# Patient Record
Sex: FEMALE | Race: ASIAN | NOT HISPANIC OR LATINO | URBAN - METROPOLITAN AREA
[De-identification: names, ages, dates, MRNs, and addresses within clinical notes are randomized per-mention and may not be internally consistent; named-entity substitution may affect disease eponyms.]

---

## 2018-03-04 ENCOUNTER — INPATIENT (INPATIENT)
Facility: HOSPITAL | Age: 83
LOS: 3 days | Discharge: HOME HEALTH | End: 2018-03-08
Attending: INTERNAL MEDICINE | Admitting: INTERNAL MEDICINE
Payer: MEDICAID

## 2018-03-04 VITALS
HEART RATE: 65 BPM | RESPIRATION RATE: 22 BRPM | OXYGEN SATURATION: 98 % | DIASTOLIC BLOOD PRESSURE: 75 MMHG | TEMPERATURE: 98 F | SYSTOLIC BLOOD PRESSURE: 148 MMHG

## 2018-03-04 DIAGNOSIS — I10 ESSENTIAL (PRIMARY) HYPERTENSION: ICD-10-CM

## 2018-03-04 DIAGNOSIS — E78.5 HYPERLIPIDEMIA, UNSPECIFIED: ICD-10-CM

## 2018-03-04 DIAGNOSIS — E11.9 TYPE 2 DIABETES MELLITUS WITHOUT COMPLICATIONS: ICD-10-CM

## 2018-03-04 DIAGNOSIS — I50.23 ACUTE ON CHRONIC SYSTOLIC (CONGESTIVE) HEART FAILURE: ICD-10-CM

## 2018-03-04 DIAGNOSIS — Z95.2 PRESENCE OF PROSTHETIC HEART VALVE: Chronic | ICD-10-CM

## 2018-03-04 DIAGNOSIS — I50.9 HEART FAILURE, UNSPECIFIED: ICD-10-CM

## 2018-03-04 PROBLEM — Z00.00 ENCOUNTER FOR PREVENTIVE HEALTH EXAMINATION: Status: ACTIVE | Noted: 2018-03-04

## 2018-03-04 LAB
ALBUMIN SERPL ELPH-MCNC: 3.6 G/DL — SIGNIFICANT CHANGE UP (ref 3.3–5)
ALP SERPL-CCNC: 87 U/L — SIGNIFICANT CHANGE UP (ref 40–120)
ALT FLD-CCNC: 16 U/L — SIGNIFICANT CHANGE UP (ref 4–33)
APTT BLD: 30.8 SEC — SIGNIFICANT CHANGE UP (ref 27.5–37.4)
AST SERPL-CCNC: 31 U/L — SIGNIFICANT CHANGE UP (ref 4–32)
BASE EXCESS BLDV CALC-SCNC: -0.4 MMOL/L — SIGNIFICANT CHANGE UP
BASOPHILS # BLD AUTO: 0.01 K/UL — SIGNIFICANT CHANGE UP (ref 0–0.2)
BASOPHILS NFR BLD AUTO: 0.2 % — SIGNIFICANT CHANGE UP (ref 0–2)
BILIRUB SERPL-MCNC: 0.6 MG/DL — SIGNIFICANT CHANGE UP (ref 0.2–1.2)
BLOOD GAS VENOUS - CREATININE: 0.84 MG/DL — SIGNIFICANT CHANGE UP (ref 0.5–1.3)
BUN SERPL-MCNC: 25 MG/DL — HIGH (ref 7–23)
CALCIUM SERPL-MCNC: 8.5 MG/DL — SIGNIFICANT CHANGE UP (ref 8.4–10.5)
CHLORIDE BLDV-SCNC: 101 MMOL/L — SIGNIFICANT CHANGE UP (ref 96–108)
CHLORIDE SERPL-SCNC: 97 MMOL/L — LOW (ref 98–107)
CK MB BLD-MCNC: 2.89 NG/ML — SIGNIFICANT CHANGE UP (ref 1–4.7)
CK SERPL-CCNC: 51 U/L — SIGNIFICANT CHANGE UP (ref 25–170)
CO2 SERPL-SCNC: 22 MMOL/L — SIGNIFICANT CHANGE UP (ref 22–31)
CREAT SERPL-MCNC: 1.04 MG/DL — SIGNIFICANT CHANGE UP (ref 0.5–1.3)
EOSINOPHIL # BLD AUTO: 0.08 K/UL — SIGNIFICANT CHANGE UP (ref 0–0.5)
EOSINOPHIL NFR BLD AUTO: 1.6 % — SIGNIFICANT CHANGE UP (ref 0–6)
GAS PNL BLDV: 129 MMOL/L — LOW (ref 136–146)
GLUCOSE BLDV-MCNC: 184 — HIGH (ref 70–99)
GLUCOSE SERPL-MCNC: 195 MG/DL — HIGH (ref 70–99)
HCO3 BLDV-SCNC: 22 MMOL/L — SIGNIFICANT CHANGE UP (ref 20–27)
HCT VFR BLD CALC: 31.8 % — LOW (ref 34.5–45)
HCT VFR BLDV CALC: 32.4 % — LOW (ref 34.5–45)
HGB BLD-MCNC: 10.2 G/DL — LOW (ref 11.5–15.5)
HGB BLDV-MCNC: 10.5 G/DL — LOW (ref 11.5–15.5)
IMM GRANULOCYTES # BLD AUTO: 0.01 # — SIGNIFICANT CHANGE UP
IMM GRANULOCYTES NFR BLD AUTO: 0.2 % — SIGNIFICANT CHANGE UP (ref 0–1.5)
INR BLD: 1.26 — HIGH (ref 0.88–1.17)
LACTATE BLDV-MCNC: 1.1 MMOL/L — SIGNIFICANT CHANGE UP (ref 0.5–2)
LG PLATELETS BLD QL AUTO: SLIGHT — SIGNIFICANT CHANGE UP
LIDOCAIN IGE QN: 41.9 U/L — SIGNIFICANT CHANGE UP (ref 7–60)
LYMPHOCYTES # BLD AUTO: 1.04 K/UL — SIGNIFICANT CHANGE UP (ref 1–3.3)
LYMPHOCYTES # BLD AUTO: 20.9 % — SIGNIFICANT CHANGE UP (ref 13–44)
MANUAL SMEAR VERIFICATION: SIGNIFICANT CHANGE UP
MCHC RBC-ENTMCNC: 26.2 PG — LOW (ref 27–34)
MCHC RBC-ENTMCNC: 32.1 % — SIGNIFICANT CHANGE UP (ref 32–36)
MCV RBC AUTO: 81.7 FL — SIGNIFICANT CHANGE UP (ref 80–100)
MONOCYTES # BLD AUTO: 0.47 K/UL — SIGNIFICANT CHANGE UP (ref 0–0.9)
MONOCYTES NFR BLD AUTO: 9.4 % — SIGNIFICANT CHANGE UP (ref 2–14)
NEUTROPHILS # BLD AUTO: 3.37 K/UL — SIGNIFICANT CHANGE UP (ref 1.8–7.4)
NEUTROPHILS NFR BLD AUTO: 67.7 % — SIGNIFICANT CHANGE UP (ref 43–77)
NRBC # FLD: 0 — SIGNIFICANT CHANGE UP
NT-PROBNP SERPL-SCNC: SIGNIFICANT CHANGE UP PG/ML
NT-PROBNP SERPL-SCNC: SIGNIFICANT CHANGE UP PG/ML
PCO2 BLDV: 46 MMHG — SIGNIFICANT CHANGE UP (ref 41–51)
PH BLDV: 7.34 PH — SIGNIFICANT CHANGE UP (ref 7.32–7.43)
PLATELET # BLD AUTO: 120 K/UL — LOW (ref 150–400)
PLATELET CLUMP BLD QL SMEAR: SLIGHT — SIGNIFICANT CHANGE UP
PLATELET COUNT - ESTIMATE: SIGNIFICANT CHANGE UP
PMV BLD: 12 FL — SIGNIFICANT CHANGE UP (ref 7–13)
PO2 BLDV: < 24 MMHG — LOW (ref 35–40)
POTASSIUM BLDV-SCNC: 4.4 MMOL/L — SIGNIFICANT CHANGE UP (ref 3.4–4.5)
POTASSIUM SERPL-MCNC: 4.4 MMOL/L — SIGNIFICANT CHANGE UP (ref 3.5–5.3)
POTASSIUM SERPL-SCNC: 4.4 MMOL/L — SIGNIFICANT CHANGE UP (ref 3.5–5.3)
PROT SERPL-MCNC: 7.8 G/DL — SIGNIFICANT CHANGE UP (ref 6–8.3)
PROTHROM AB SERPL-ACNC: 14.1 SEC — HIGH (ref 9.8–13.1)
RBC # BLD: 3.89 M/UL — SIGNIFICANT CHANGE UP (ref 3.8–5.2)
RBC # FLD: 16.6 % — HIGH (ref 10.3–14.5)
SAO2 % BLDV: 15.4 % — LOW (ref 60–85)
SODIUM SERPL-SCNC: 132 MMOL/L — LOW (ref 135–145)
TROPONIN T SERPL-MCNC: < 0.06 NG/ML — SIGNIFICANT CHANGE UP (ref 0–0.06)
WBC # BLD: 4.98 K/UL — SIGNIFICANT CHANGE UP (ref 3.8–10.5)
WBC # FLD AUTO: 4.98 K/UL — SIGNIFICANT CHANGE UP (ref 3.8–10.5)

## 2018-03-04 PROCEDURE — 71045 X-RAY EXAM CHEST 1 VIEW: CPT | Mod: 26

## 2018-03-04 PROCEDURE — 74177 CT ABD & PELVIS W/CONTRAST: CPT | Mod: 26

## 2018-03-04 RX ORDER — INSULIN LISPRO 100/ML
VIAL (ML) SUBCUTANEOUS
Qty: 0 | Refills: 0 | Status: DISCONTINUED | OUTPATIENT
Start: 2018-03-04 | End: 2018-03-08

## 2018-03-04 RX ORDER — DEXTROSE 50 % IN WATER 50 %
25 SYRINGE (ML) INTRAVENOUS ONCE
Qty: 0 | Refills: 0 | Status: DISCONTINUED | OUTPATIENT
Start: 2018-03-04 | End: 2018-03-08

## 2018-03-04 RX ORDER — ATORVASTATIN CALCIUM 80 MG/1
10 TABLET, FILM COATED ORAL
Qty: 0 | Refills: 0 | COMMUNITY

## 2018-03-04 RX ORDER — SODIUM CHLORIDE 9 MG/ML
1000 INJECTION, SOLUTION INTRAVENOUS
Qty: 0 | Refills: 0 | Status: DISCONTINUED | OUTPATIENT
Start: 2018-03-04 | End: 2018-03-08

## 2018-03-04 RX ORDER — FAMOTIDINE 10 MG/ML
20 INJECTION INTRAVENOUS ONCE
Qty: 0 | Refills: 0 | Status: COMPLETED | OUTPATIENT
Start: 2018-03-04 | End: 2018-03-04

## 2018-03-04 RX ORDER — FUROSEMIDE 40 MG
40 TABLET ORAL ONCE
Qty: 0 | Refills: 0 | Status: COMPLETED | OUTPATIENT
Start: 2018-03-04 | End: 2018-03-04

## 2018-03-04 RX ORDER — GLUCAGON INJECTION, SOLUTION 0.5 MG/.1ML
1 INJECTION, SOLUTION SUBCUTANEOUS ONCE
Qty: 0 | Refills: 0 | Status: DISCONTINUED | OUTPATIENT
Start: 2018-03-04 | End: 2018-03-08

## 2018-03-04 RX ORDER — DEXTROSE 50 % IN WATER 50 %
12.5 SYRINGE (ML) INTRAVENOUS ONCE
Qty: 0 | Refills: 0 | Status: DISCONTINUED | OUTPATIENT
Start: 2018-03-04 | End: 2018-03-08

## 2018-03-04 RX ORDER — PANTOPRAZOLE SODIUM 20 MG/1
40 TABLET, DELAYED RELEASE ORAL
Qty: 0 | Refills: 0 | Status: DISCONTINUED | OUTPATIENT
Start: 2018-03-04 | End: 2018-03-05

## 2018-03-04 RX ORDER — GLIMEPIRIDE 1 MG
1 TABLET ORAL
Qty: 0 | Refills: 0 | COMMUNITY

## 2018-03-04 RX ORDER — ATORVASTATIN CALCIUM 80 MG/1
10 TABLET, FILM COATED ORAL AT BEDTIME
Qty: 0 | Refills: 0 | Status: DISCONTINUED | OUTPATIENT
Start: 2018-03-04 | End: 2018-03-08

## 2018-03-04 RX ORDER — DEXTROSE 50 % IN WATER 50 %
1 SYRINGE (ML) INTRAVENOUS ONCE
Qty: 0 | Refills: 0 | Status: DISCONTINUED | OUTPATIENT
Start: 2018-03-04 | End: 2018-03-08

## 2018-03-04 RX ORDER — SITAGLIPTIN 50 MG/1
1 TABLET, FILM COATED ORAL
Qty: 0 | Refills: 0 | COMMUNITY

## 2018-03-04 RX ORDER — ENOXAPARIN SODIUM 100 MG/ML
40 INJECTION SUBCUTANEOUS DAILY
Qty: 0 | Refills: 0 | Status: DISCONTINUED | OUTPATIENT
Start: 2018-03-04 | End: 2018-03-08

## 2018-03-04 RX ORDER — LISINOPRIL 2.5 MG/1
5 TABLET ORAL DAILY
Qty: 0 | Refills: 0 | Status: DISCONTINUED | OUTPATIENT
Start: 2018-03-04 | End: 2018-03-08

## 2018-03-04 RX ORDER — METOPROLOL TARTRATE 50 MG
50 TABLET ORAL
Qty: 0 | Refills: 0 | Status: DISCONTINUED | OUTPATIENT
Start: 2018-03-04 | End: 2018-03-08

## 2018-03-04 RX ORDER — FUROSEMIDE 40 MG
40 TABLET ORAL
Qty: 0 | Refills: 0 | Status: DISCONTINUED | OUTPATIENT
Start: 2018-03-04 | End: 2018-03-07

## 2018-03-04 RX ORDER — METFORMIN HYDROCHLORIDE 850 MG/1
0 TABLET ORAL
Qty: 0 | Refills: 0 | COMMUNITY

## 2018-03-04 RX ORDER — OMEPRAZOLE 10 MG/1
1 CAPSULE, DELAYED RELEASE ORAL
Qty: 0 | Refills: 0 | COMMUNITY

## 2018-03-04 RX ADMIN — FAMOTIDINE 20 MILLIGRAM(S): 10 INJECTION INTRAVENOUS at 09:42

## 2018-03-04 RX ADMIN — Medication 40 MILLIGRAM(S): at 18:22

## 2018-03-04 RX ADMIN — Medication 30 MILLILITER(S): at 09:42

## 2018-03-04 RX ADMIN — LISINOPRIL 5 MILLIGRAM(S): 2.5 TABLET ORAL at 18:23

## 2018-03-04 RX ADMIN — Medication 2: at 18:22

## 2018-03-04 RX ADMIN — ATORVASTATIN CALCIUM 10 MILLIGRAM(S): 80 TABLET, FILM COATED ORAL at 23:50

## 2018-03-04 RX ADMIN — Medication 50 MILLIGRAM(S): at 18:22

## 2018-03-04 RX ADMIN — Medication 40 MILLIGRAM(S): at 13:36

## 2018-03-04 NOTE — H&P ADULT - PMH
DM (diabetes mellitus)    Heart failure, systolic, due to CAD    HLD (hyperlipidemia)    HTN (hypertension)

## 2018-03-04 NOTE — ED PROVIDER NOTE - OBJECTIVE STATEMENT
82yo F pmhx htn hld dm p/w CC abdominal pain/SOB x past month worsening over past 2 days, reports burning epigastric pain, describes it as acidity, radiates into her chest, associated w/ SOB when lying flat, and abdominal distension. Last BM was today and was small, not passing flatus. Denies fever chills n/v/d urinary symptoms. 84yo F pmhx htn hld dm p/w CC abdominal pain/SOB x past month worsening over past 2 days, reports burning epigastric pain, describes it as acidity, radiates into her chest, associated w/ SOB when lying flat, and abdominal distension. Last BM was today and was small, not passing flatus. Denies fever chills n/v/d urinary symptoms..

## 2018-03-04 NOTE — ED PROVIDER NOTE - PROGRESS NOTE DETAILS
Spoke w/ radiology, concern for R ext. iliac art. focal dissection. Vascular surgery consulted. Vascular recommending continuing aspirin at this time, no need for surgical intervention

## 2018-03-04 NOTE — ED PROVIDER NOTE - MEDICAL DECISION MAKING DETAILS
84yo F pmhx htn hld dm p/w CC abdominal pain - poor historian - concerning for possible SBO/intraabdominal process, cannot r/o atypical ACS presentation although suspicion is - will send labs lipase troponins ct abd/pelv symptomatic rx and reassess.

## 2018-03-04 NOTE — H&P ADULT - ATTENDING COMMENTS
ekg - nsr lbbb    a/p     1) Epigastric pain - mild tenderness, CT a/p doesn't explain pain, will get GI consult     2) Acute on chronic systolic CHF exacerbation - cont IV lasix, as per daughter in chart EF 25%, tried to get in touch with daughter , but phone number doesn't work. will see if pt has had ischemia workup    3) HLD - on insulin

## 2018-03-04 NOTE — H&P ADULT - HISTORY OF PRESENT ILLNESS
This is  a 82 yo Fa c/o epigastric pain x 2 days . Patient  has been having epigastric pain with SOB and Orthopnea. Pain is described as tightness around her epigastric area to the back.  No agravating or alleviating factors. Pain occurs when she ambulates and even when sitting down. As per her daughter she has been unable to lay flat and sits up often due to orthopnea. She also has had lower extremity edema x 3 days. Patient has been compliant with her lasix 20mg BID. As per patient's daughter her EF is 24%.   No palpitations, no fever , chills, No abd pain, N/V/D .

## 2018-03-04 NOTE — ED PROVIDER NOTE - ATTENDING CONTRIBUTION TO CARE
yady: hx from family with grandson at bedside as .   1: one month of abd pain increasing past 3 days with increasing abdominal girth. Saw pcp who referred pt to GI; not yet had appt. Labs from 2/17/18 unremarkable.   2: Increasing betts for weeks/months; past few days notes dyspnea of laying flat. leg swelling for 2 days.  pmh includes HTN, DM, valve replacement, thyroid disease. also on lasix (reason?).  exam: NAD at rest. sat 100% RA.  exam remarkable for mild abdominal distention, diffuse moderate abdominal tenderness.   mild LE swelling.   impression: etio of sx unclear. need to cardiomyopathy and/or primary abdominal process  recc: cxr, labs including probnp and troponin. CT abdomen

## 2018-03-04 NOTE — ED ADULT NURSE NOTE - OBJECTIVE STATEMENT
Pt rec/d in 20, accompanied by family, c/o 3 days of generalized abd pain and SOB, unable to lay flat. Denies chest pain. Noted with mild BLE edema. PMH of DM, HTN, HCL, denies CHF history, takes Lasix. Denies N/V. Per family, pt had scant BMs and is not passing gas. Had surgery to replace cardiac valve 2 years ago Pt rec/d in 20, accompanied by family, c/o 3 days of generalized abd pain and SOB, unable to lay flat. Denies chest pain. Noted with mild BLE edema. PMH of DM, HTN, HCL, denies CHF history, takes Lasix. Denies N/V. Per family, pt had scant BMs and is not passing gas. Had surgery to replace cardiac valve 2 years ago. At home, pt doesn't walk much aside from being helped to and from bathroom. Pt rec/d in 20, accompanied by family, c/o generalized abd pain x 1 month, SOB x 3 days, unable to lay flat. Denies chest pain. Noted with mild BLE edema. PMH of DM, HTN, HCL, denies CHF history, takes Lasix. Denies N/V. Per family, pt had scant BMs and is not passing gas. Had surgery to replace cardiac valve 2 years ago. At home, pt doesn't walk much aside from being helped to and from bathroom.

## 2018-03-05 DIAGNOSIS — N18.3 CHRONIC KIDNEY DISEASE, STAGE 3 (MODERATE): ICD-10-CM

## 2018-03-05 DIAGNOSIS — D64.9 ANEMIA, UNSPECIFIED: ICD-10-CM

## 2018-03-05 DIAGNOSIS — E87.1 HYPO-OSMOLALITY AND HYPONATREMIA: ICD-10-CM

## 2018-03-05 DIAGNOSIS — E83.51 HYPOCALCEMIA: ICD-10-CM

## 2018-03-05 LAB
APPEARANCE UR: CLEAR — SIGNIFICANT CHANGE UP
BILIRUB UR-MCNC: NEGATIVE — SIGNIFICANT CHANGE UP
BLOOD UR QL VISUAL: NEGATIVE — SIGNIFICANT CHANGE UP
BUN SERPL-MCNC: 27 MG/DL — HIGH (ref 7–23)
CALCIUM SERPL-MCNC: 8.3 MG/DL — LOW (ref 8.4–10.5)
CHLORIDE SERPL-SCNC: 97 MMOL/L — LOW (ref 98–107)
CK MB BLD-MCNC: 2.46 NG/ML — SIGNIFICANT CHANGE UP (ref 1–4.7)
CK MB BLD-MCNC: SIGNIFICANT CHANGE UP (ref 0–2.5)
CK SERPL-CCNC: 52 U/L — SIGNIFICANT CHANGE UP (ref 25–170)
CO2 SERPL-SCNC: 22 MMOL/L — SIGNIFICANT CHANGE UP (ref 22–31)
COLOR SPEC: SIGNIFICANT CHANGE UP
CREAT SERPL-MCNC: 1.03 MG/DL — SIGNIFICANT CHANGE UP (ref 0.5–1.3)
GLUCOSE SERPL-MCNC: 69 MG/DL — LOW (ref 70–99)
GLUCOSE UR-MCNC: NEGATIVE — SIGNIFICANT CHANGE UP
HBA1C BLD-MCNC: 7.6 % — HIGH (ref 4–5.6)
HCT VFR BLD CALC: 28.5 % — LOW (ref 34.5–45)
HGB BLD-MCNC: 9.3 G/DL — LOW (ref 11.5–15.5)
HYALINE CASTS # UR AUTO: SIGNIFICANT CHANGE UP (ref 0–?)
KETONES UR-MCNC: NEGATIVE — SIGNIFICANT CHANGE UP
LEUKOCYTE ESTERASE UR-ACNC: HIGH
MAGNESIUM SERPL-MCNC: 1.9 MG/DL — SIGNIFICANT CHANGE UP (ref 1.6–2.6)
MCHC RBC-ENTMCNC: 26.3 PG — LOW (ref 27–34)
MCHC RBC-ENTMCNC: 32.6 % — SIGNIFICANT CHANGE UP (ref 32–36)
MCV RBC AUTO: 80.5 FL — SIGNIFICANT CHANGE UP (ref 80–100)
NITRITE UR-MCNC: NEGATIVE — SIGNIFICANT CHANGE UP
NRBC # FLD: 0 — SIGNIFICANT CHANGE UP
OSMOLALITY UR: 277 MOSMO/KG — SIGNIFICANT CHANGE UP (ref 50–1200)
PH UR: 6 — SIGNIFICANT CHANGE UP (ref 4.6–8)
PLATELET # BLD AUTO: 113 K/UL — LOW (ref 150–400)
PMV BLD: 11.7 FL — SIGNIFICANT CHANGE UP (ref 7–13)
POTASSIUM SERPL-MCNC: 3.5 MMOL/L — SIGNIFICANT CHANGE UP (ref 3.5–5.3)
POTASSIUM SERPL-SCNC: 3.5 MMOL/L — SIGNIFICANT CHANGE UP (ref 3.5–5.3)
PROT UR-MCNC: 20 MG/DL — SIGNIFICANT CHANGE UP
RBC # BLD: 3.54 M/UL — LOW (ref 3.8–5.2)
RBC # FLD: 16.4 % — HIGH (ref 10.3–14.5)
RBC CASTS # UR COMP ASSIST: SIGNIFICANT CHANGE UP (ref 0–?)
SODIUM SERPL-SCNC: 134 MMOL/L — LOW (ref 135–145)
SODIUM UR-SCNC: 64 MMOL/L — SIGNIFICANT CHANGE UP
SP GR SPEC: 1.01 — SIGNIFICANT CHANGE UP (ref 1–1.04)
SQUAMOUS # UR AUTO: SIGNIFICANT CHANGE UP
TROPONIN T SERPL-MCNC: < 0.06 NG/ML — SIGNIFICANT CHANGE UP (ref 0–0.06)
UROBILINOGEN FLD QL: NORMAL MG/DL — SIGNIFICANT CHANGE UP
WBC # BLD: 5.06 K/UL — SIGNIFICANT CHANGE UP (ref 3.8–10.5)
WBC # FLD AUTO: 5.06 K/UL — SIGNIFICANT CHANGE UP (ref 3.8–10.5)
WBC UR QL: HIGH (ref 0–?)

## 2018-03-05 RX ORDER — PANTOPRAZOLE SODIUM 20 MG/1
40 TABLET, DELAYED RELEASE ORAL
Qty: 0 | Refills: 0 | Status: DISCONTINUED | OUTPATIENT
Start: 2018-03-05 | End: 2018-03-08

## 2018-03-05 RX ORDER — INSULIN LISPRO 100/ML
VIAL (ML) SUBCUTANEOUS AT BEDTIME
Qty: 0 | Refills: 0 | Status: DISCONTINUED | OUTPATIENT
Start: 2018-03-05 | End: 2018-03-08

## 2018-03-05 RX ORDER — SUCRALFATE 1 G
1 TABLET ORAL
Qty: 0 | Refills: 0 | Status: DISCONTINUED | OUTPATIENT
Start: 2018-03-05 | End: 2018-03-08

## 2018-03-05 RX ADMIN — Medication 40 MILLIGRAM(S): at 17:47

## 2018-03-05 RX ADMIN — Medication 3: at 17:46

## 2018-03-05 RX ADMIN — PANTOPRAZOLE SODIUM 40 MILLIGRAM(S): 20 TABLET, DELAYED RELEASE ORAL at 06:19

## 2018-03-05 RX ADMIN — ATORVASTATIN CALCIUM 10 MILLIGRAM(S): 80 TABLET, FILM COATED ORAL at 21:21

## 2018-03-05 RX ADMIN — Medication: at 00:34

## 2018-03-05 RX ADMIN — LISINOPRIL 5 MILLIGRAM(S): 2.5 TABLET ORAL at 06:19

## 2018-03-05 RX ADMIN — ENOXAPARIN SODIUM 40 MILLIGRAM(S): 100 INJECTION SUBCUTANEOUS at 12:20

## 2018-03-05 RX ADMIN — Medication 3: at 12:20

## 2018-03-05 RX ADMIN — PANTOPRAZOLE SODIUM 40 MILLIGRAM(S): 20 TABLET, DELAYED RELEASE ORAL at 17:46

## 2018-03-05 RX ADMIN — Medication 50 MILLIGRAM(S): at 06:19

## 2018-03-05 RX ADMIN — Medication 1 GRAM(S): at 17:46

## 2018-03-05 RX ADMIN — Medication 50 MILLIGRAM(S): at 17:47

## 2018-03-05 RX ADMIN — Medication 40 MILLIGRAM(S): at 06:18

## 2018-03-05 NOTE — CONSULT NOTE ADULT - CONSULT REASON
Focal External iliac dissection
epigastric pain, bloating, chest pain
medical mamagement
Hyponatremia

## 2018-03-05 NOTE — CONSULT NOTE ADULT - PROBLEM SELECTOR RECOMMENDATION 9
likely CHF and/or polydipsia  check urine osmo, na, tsh and cortisol to r/o other etiology   fluid restriction <1L/day  improving, continue to monitor

## 2018-03-05 NOTE — ED ADULT NURSE REASSESSMENT NOTE - NS ED NURSE REASSESS COMMENT FT1
Break RN: pt sleeping, respirations equal and unlabored, appears comfortable. NSR on cardiac monitor @ 63bpm. Safety and comfort maintained. Family at bedside.

## 2018-03-05 NOTE — CONSULT NOTE ADULT - ASSESSMENT
82 yo F admitted with acute on chronic systolic heart failure     Problem/Plan - 1:  ·  Problem: Acute on chronic systolic congestive heart failure.  Plan: lisinopril  DIURESIS AS PER CARDS  daily I & O   daily weight  tele  ECHO.   iSCHEMIA GREEN AS PER CARDS    Problem/Plan - 2:  ·  Problem: Hyperlipidemia, unspecified hyperlipidemia type.  Plan: lipitor.     Problem/Plan - 3:  ·  Problem: Type 2 diabetes mellitus without complication, unspecified long term insulin use status.  Plan: FS qid  CCU/DASH diet   humalog SS.     Problem/Plan - 4:  ·  Problem: Essential hypertension.  Plan: cw current meds
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
I have recommended bid ppi for dyspepsia gastritis.  cardiology evaluation ongoing.  Ct negative, will get sonogram to rule out billiary disease.  endosocpy can be consdiere,d but risk may be greather than benefit based on age and comribidies.
84 yo F PMH of CHF, CAD, HTN, DM present c/o chest heaviness and SOB

## 2018-03-05 NOTE — CONSULT NOTE ADULT - SUBJECTIVE AND OBJECTIVE BOX
82yo F pmhx htn hld dm p/w CC abdominal pain/SOB x past month worsening over past 2 days, reports burning epigastric pain, describes it as acidity, radiates into her chest, associated w/ SOB when lying flat, and abdominal distension. Last BM was today and was small, not passing flatus. Denies fever chills n/v/d urinary symptoms.  Vascular surgery consulted for finding of focal dissection of external iliac artery dissection on imaging.  Patient does not complain of any leg pain with rest or activity.  Ambulatory at home.      PAST MEDICAL & SURGICAL HISTORY:  DM (diabetes mellitus)  HLD (hyperlipidemia)  HTN (hypertension)  Aortic valve replaced    ICU Vital Signs Last 24 Hrs  T(C): 36.7 (04 Mar 2018 08:33), Max: 36.7 (04 Mar 2018 08:33)  T(F): 98 (04 Mar 2018 08:33), Max: 98 (04 Mar 2018 08:33)  HR: 72 (04 Mar 2018 11:32) (65 - 74)  BP: 151/83 (04 Mar 2018 11:32) (148/75 - 151/83)  BP(mean): --  ABP: --  ABP(mean): --  RR: 18 (04 Mar 2018 11:32) (18 - 22)  SpO2: 99% (04 Mar 2018 11:32) (98% - 100%)    General:  Sitting up in bed, appears comfortable  Chest:  reduced breath sounds along right lung base; breath sounds audible bilaterally  Abdomen:  Soft, nontender, nondistended  Extremities:  Bilateral 2+ edema to the mid calf  Pulses:  2+ bilateral femoral, DP and PT                          10.2   4.98  )-----------( 120      ( 04 Mar 2018 09:45 )             31.8   03-04    132<L>  |  97<L>  |  25<H>  ----------------------------<  195<H>  4.4   |  22  |  1.04    Ca    8.5      04 Mar 2018 09:45    TPro  7.8  /  Alb  3.6  /  TBili  0.6  /  DBili  x   /  AST  31  /  ALT  16  /  AlkPhos  87  03-04    < from: CT Abdomen and Pelvis w/ Oral Cont and w/ IV Cont (03.04.18 @ 11:54) >  INTERPRETATION:  CLINICAL INFORMATION: Abdominal pain    COMPARISON: None.    PROCEDURE:   CT of the Abdomen and Pelvis was performed with intravenous contrast.   Intravenous contrast: 90 ml Omnipaque 350. 10 ml discarded.  Oral contrast: positive contrast was administered.  Sagittal and coronal reformats were performed.    FINDINGS:    LOWER CHEST: Mild bilateral lower lobe interlobular septal thickening.   Small right and trace left pleural effusions. Cardiomegaly. Aortic and   mitral valve replacements. Coronary artery calcifications.    LIVER: Within normal limits.  BILE DUCTS: Normal caliber.  GALLBLADDER: Within normal limits.  SPLEEN: Within normal limits.  PANCREAS: Within normal limits.  ADRENALS: Within normal limits.  KIDNEYS/URETERS: No hydronephrosis.    BLADDER: Within normal limits.  REPRODUCTIVE ORGANS: Hysterectomy.    BOWEL: No bowel obstruction. Appendix is not visualized  PERITONEUM: No ascites.  VESSELS:  Atheromatous changes of the abdominal aorta. Significant   narrowing of the external celiac artery. The superior mesenteric artery   inferior mesenteric artery demonstrate patency. Focal dissection of the   mid right external iliac artery.  RETROPERITONEUM: No lymphadenopathy.    ABDOMINAL WALL: Within normal limits.  BONES: Moderate age indeterminate compression deformity of the L1   vertebral body and mild age indeterminate compression deformities of the   L2 and L3 vertebral bodies. Spinal degenerative changes    IMPRESSION:     Mild interstitial edema and small right and trace left pleural effusions.  Cardiomegaly.  Age indeterminate compression deformities of the L1-L3 vertebral bodies.  Focal dissection of the mid rightexternal iliac artery.    < end of copied text >
Patient is a 83y old  Female who presents with a chief complaint of chest pain (04 Mar 2018 16:36)      HPI:  This is  a 82 yo Fa c/o epigastric pain x 2 days . Patient  has been having epigastric pain with SOB and Orthopnea. Pain is described as tightness around her epigastric area to the back.  No agravating or alleviating factors. Pain occurs when she ambulates and even when sitting down. As per her daughter she has been unable to lay flat and sits up often due to orthopnea. She also has had lower extremity edema x 3 days. Patient has been compliant with her lasix 20mg BID. As per patient's daughter her EF is 24%.   No palpitations, no fever , chills, No abd pain, N/V/D . (04 Mar 2018 16:36)      PAST MEDICAL & SURGICAL HISTORY:  Heart failure, systolic, due to CAD  DM (diabetes mellitus)  HLD (hyperlipidemia)  HTN (hypertension)  Aortic valve replaced      MEDICATIONS  (STANDING):  atorvastatin 10 milliGRAM(s) Oral at bedtime  dextrose 5%. 1000 milliLiter(s) (50 mL/Hr) IV Continuous <Continuous>  dextrose 50% Injectable 12.5 Gram(s) IV Push once  dextrose 50% Injectable 25 Gram(s) IV Push once  dextrose 50% Injectable 25 Gram(s) IV Push once  enoxaparin Injectable 40 milliGRAM(s) SubCutaneous daily  furosemide   Injectable 40 milliGRAM(s) IV Push two times a day  insulin lispro (HumaLOG) corrective regimen sliding scale   SubCutaneous three times a day before meals  insulin lispro (HumaLOG) corrective regimen sliding scale   SubCutaneous at bedtime  lisinopril 5 milliGRAM(s) Oral daily  metoprolol     tartrate 50 milliGRAM(s) Oral two times a day  pantoprazole    Tablet 40 milliGRAM(s) Oral before breakfast      Allergies    No Known Allergies    Intolerances        SOCIAL HISTORY:  Denies ETOh,Smoking,     FAMILY HISTORY:      REVIEW OF SYSTEMS:    CONSTITUTIONAL: No weakness, fevers or chills  EYES/ENT: No visual changes;  No vertigo or throat pain   NECK: No pain or stiffness  RESPIRATORY: No cough, wheezing, hemoptysis; No shortness of breath  CARDIOVASCULAR: No chest pain or palpitations  GASTROINTESTINAL: No abdominal or epigastric pain. No nausea, vomiting, or hematemesis; No diarrhea or constipation. No melena or hematochezia.  GENITOURINARY: No dysuria, frequency or hematuria  NEUROLOGICAL: No numbness or weakness  SKIN: No itching, burning, rashes, or lesions   All other review of systems is negative unless indicated above.    VITAL:  T(C): , Max: 36.7 (03-04-18 @ 18:19)  T(F): , Max: 98.1 (03-04-18 @ 18:19)  HR: 78 (03-05-18 @ 09:53)  BP: 131/64 (03-05-18 @ 09:53)  BP(mean): --  RR: 23 (03-05-18 @ 09:53)  SpO2: 98% (03-05-18 @ 09:53)  Wt(kg): --    I and O's:    03-04 @ 07:01  -  03-05 @ 07:00  --------------------------------------------------------  IN: 0 mL / OUT: 600 mL / NET: -600 mL          PHYSICAL EXAM:    Constitutional: NAD  HEENT: PERRLA,   Neck: No JVD  Respiratory: CTA B/L  Cardiovascular: S1 and S2  Gastrointestinal: BS+, soft, NT/ND  Extremities: No peripheral edema  Neurological: A/O x 3, no focal deficits  Psychiatric: Normal mood, normal affect  : No Fu  Skin: No rashes  Access: Not applicable  Back: No CVA tenderness    LABS:                        9.3    5.06  )-----------( 113      ( 05 Mar 2018 05:15 )             28.5     03-05    134<L>  |  97<L>  |  27<H>  ----------------------------<  69<L>  3.5   |  22  |  1.03    Ca    8.3<L>      05 Mar 2018 05:15  Mg     1.9     03-05    TPro  7.8  /  Alb  3.6  /  TBili  0.6  /  DBili  x   /  AST  31  /  ALT  16  /  AlkPhos  87  03-04          RADIOLOGY & ADDITIONAL STUDIES:
cc EPIGASTRIC PAIN  [place] 84 yo Fa c/o epigastric pain x 2 days . Patient  has been having epigastric pain with SOB and Orthopnea. Pain is described as tightness around her epigastric area to the back.  No agravating or alleviating factors. Pain occurs when she ambulates and even when sitting down. As per her daughter she has been unable to lay flat and sits up often due to orthopnea. She also has had lower extremity edema x 3 days. Patient has been compliant with her lasix 20mg BID. As per patient's daughter her EF is 24%.   No palpitations, no fever , chills, No abd pain, N/V/D .    Allergies NKDA    REVIEW OF SYSTEMS:    CONSTITUTIONAL: No weakness, fevers or chills  EYES/ENT: No visual changes;  No vertigo or throat pain   NECK: No pain or stiffness  RESPIRATORY: No cough, wheezing, hemoptysis; No shortness of breath  CARDIOVASCULAR: No chest pain or palpitations  GASTROINTESTINAL: No abdominal or epigastric pain. No nausea, vomiting, or hematemesis; No diarrhea or constipation. No melena or hematochezia.  GENITOURINARY: No dysuria, frequency or hematuria  NEUROLOGICAL: No numbness or weakness  SKIN: No itching, burning, rashes, or lesions   All other review of systems is negative unless indicated above.    Home Medications:   * Outpatient Medication Status not yet specified    .    Patient History:   Past Medical History:  DM (diabetes mellitus)    Heart failure, systolic, due to CAD    HLD (hyperlipidemia)    HTN (hypertension).    Past Surgical History:  Aortic valve replaced.    Social History:  Social History (marital status, living situation, occupation, tobacco use, alcohol and drug use, and sexual history): No smoking, no drug use .   Lives with her  and daughter	      PBYSICAL EXAM    General: WN/WD NAD  PERRLA  Neurology: A&Ox3, nonfocal, RICO x 4  Respiratory: CTA B/L  CV: RRR, S1S2, no murmurs, rubs or gallops  Abdominal: Soft, NT, ND +BS, Last BM  Extremities: EDEMA+  Skin Normal
Dr. Sue (Nephrology)  Office [phone]  Cell [phone]  Tierney MITCHELL  Cell [phone]    HPI:  This is  a 84 yo Fa c/o epigastric pain x 2 days . Patient  has been having epigastric pain with SOB and Orthopnea. Pain is described as tightness around her epigastric area to the back.  No agravating or alleviating factors. Pain occurs when she ambulates and even when sitting down. As per her daughter she has been unable to lay flat and sits up often due to orthopnea. She also has had lower extremity edema x 3 days. Patient has been compliant with her lasix 20mg BID. As per patient's daughter her EF is 24%.   No palpitations, no fever , chills, No abd pain, N/V/D .   Allergies:  No Known Allergies      PAST MEDICAL & SURGICAL HISTORY:  Heart failure, systolic, due to CAD  DM (diabetes mellitus)  HLD (hyperlipidemia)  HTN (hypertension)  Aortic valve replaced      Home Medications Reviewed    Hospital Medications:   MEDICATIONS  (STANDING):  atorvastatin 10 milliGRAM(s) Oral at bedtime  dextrose 5%. 1000 milliLiter(s) (50 mL/Hr) IV Continuous <Continuous>  dextrose 50% Injectable 12.5 Gram(s) IV Push once  dextrose 50% Injectable 25 Gram(s) IV Push once  dextrose 50% Injectable 25 Gram(s) IV Push once  enoxaparin Injectable 40 milliGRAM(s) SubCutaneous daily  furosemide   Injectable 40 milliGRAM(s) IV Push two times a day  insulin lispro (HumaLOG) corrective regimen sliding scale   SubCutaneous three times a day before meals  insulin lispro (HumaLOG) corrective regimen sliding scale   SubCutaneous at bedtime  lisinopril 5 milliGRAM(s) Oral daily  metoprolol     tartrate 50 milliGRAM(s) Oral two times a day  pantoprazole    Tablet 40 milliGRAM(s) Oral before breakfast      SOCIAL HISTORY:  Denies ETOh, Smoking,     FAMILY HISTORY:      REVIEW OF SYSTEMS:  CONSTITUTIONAL: No weakness, fevers or chills  EYES/ENT: No visual changes;  No vertigo or throat pain   NECK: No pain or stiffness  RESPIRATORY: No cough, wheezing, hemoptysis; + shortness of breath  CARDIOVASCULAR: chest heaviness, + orthopnea   GASTROINTESTINAL: No abdominal or epigastric pain. No nausea, vomiting, or hematemesis; No diarrhea or constipation. No melena or hematochezia.  GENITOURINARY: No dysuria, frequency, foamy urine, urinary urgency, incontinence or hematuria  NEUROLOGICAL: No numbness or weakness  SKIN: No itching, burning, rashes, or lesions   VASCULAR: No bilateral lower extremity edema.   All other review of systems is negative unless indicated above.    VITALS:  T(F): 97.5 (03-05-18 @ 09:53), Max: 98.1 (03-04-18 @ 18:19)  HR: 78 (03-05-18 @ 09:53)  BP: 131/64 (03-05-18 @ 09:53)  RR: 23 (03-05-18 @ 09:53)  SpO2: 98% (03-05-18 @ 09:53)  Wt(kg): --    03-04 @ 07:01  -  03-05 @ 07:00  --------------------------------------------------------  IN: 0 mL / OUT: 600 mL / NET: -600 mL          PHYSICAL EXAM:  Constitutional: NAD  HEENT: anicteric sclera, oropharynx clear, MMM  Neck: No JVD  Respiratory: CTAB, no wheezes, rales or rhonchi  Cardiovascular: S1, S2, RRR  Gastrointestinal: BS+, soft, NT/ND  Extremities: No cyanosis or clubbing. No peripheral edema  Neurological: A/O x 3, no focal deficits  Psychiatric: Normal mood, normal affect  : No CVA tenderness. No tong.   Skin: No rashes      LABS:  03-05    134<L>  |  97<L>  |  27<H>  ----------------------------<  69<L>  3.5   |  22  |  1.03    Ca    8.3<L>      05 Mar 2018 05:15  Mg     1.9     03-05    TPro  7.8  /  Alb  3.6  /  TBili  0.6  /  DBili      /  AST  31  /  ALT  16  /  AlkPhos  87  03-04    Creatinine Trend: 1.03 <--, 1.04 <--                        9.3    5.06  )-----------( 113      ( 05 Mar 2018 05:15 )             28.5     Urine Studies:        RADIOLOGY & ADDITIONAL STUDIES:

## 2018-03-06 LAB
24R-OH-CALCIDIOL SERPL-MCNC: 34.3 NG/ML — SIGNIFICANT CHANGE UP (ref 30–80)
BUN SERPL-MCNC: 27 MG/DL — HIGH (ref 7–23)
CALCIUM SERPL-MCNC: 8.4 MG/DL — SIGNIFICANT CHANGE UP (ref 8.4–10.5)
CHLORIDE SERPL-SCNC: 99 MMOL/L — SIGNIFICANT CHANGE UP (ref 98–107)
CO2 SERPL-SCNC: 25 MMOL/L — SIGNIFICANT CHANGE UP (ref 22–31)
CREAT SERPL-MCNC: 1.22 MG/DL — SIGNIFICANT CHANGE UP (ref 0.5–1.3)
FERRITIN SERPL-MCNC: 32.75 NG/ML — SIGNIFICANT CHANGE UP (ref 15–150)
GLUCOSE SERPL-MCNC: 121 MG/DL — HIGH (ref 70–99)
HCT VFR BLD CALC: 29.1 % — LOW (ref 34.5–45)
HGB BLD-MCNC: 9.3 G/DL — LOW (ref 11.5–15.5)
IRON SATN MFR SERPL: 24 UG/DL — LOW (ref 30–160)
IRON SATN MFR SERPL: 297 UG/DL — SIGNIFICANT CHANGE UP (ref 140–530)
MAGNESIUM SERPL-MCNC: 1.8 MG/DL — SIGNIFICANT CHANGE UP (ref 1.6–2.6)
MCHC RBC-ENTMCNC: 25.3 PG — LOW (ref 27–34)
MCHC RBC-ENTMCNC: 32 % — SIGNIFICANT CHANGE UP (ref 32–36)
MCV RBC AUTO: 79.1 FL — LOW (ref 80–100)
NRBC # FLD: 0 — SIGNIFICANT CHANGE UP
PHOSPHATE SERPL-MCNC: 4.2 MG/DL — SIGNIFICANT CHANGE UP (ref 2.5–4.5)
PLATELET # BLD AUTO: 118 K/UL — LOW (ref 150–400)
PMV BLD: 10.6 FL — SIGNIFICANT CHANGE UP (ref 7–13)
POTASSIUM SERPL-MCNC: 3.7 MMOL/L — SIGNIFICANT CHANGE UP (ref 3.5–5.3)
POTASSIUM SERPL-SCNC: 3.7 MMOL/L — SIGNIFICANT CHANGE UP (ref 3.5–5.3)
PTH-INTACT SERPL-MCNC: 99.49 PG/ML — HIGH (ref 15–65)
RBC # BLD: 3.68 M/UL — LOW (ref 3.8–5.2)
RBC # FLD: 16.6 % — HIGH (ref 10.3–14.5)
SODIUM SERPL-SCNC: 137 MMOL/L — SIGNIFICANT CHANGE UP (ref 135–145)
UIBC SERPL-MCNC: 273 UG/DL — SIGNIFICANT CHANGE UP (ref 110–370)
WBC # BLD: 4.49 K/UL — SIGNIFICANT CHANGE UP (ref 3.8–10.5)
WBC # FLD AUTO: 4.49 K/UL — SIGNIFICANT CHANGE UP (ref 3.8–10.5)

## 2018-03-06 PROCEDURE — 76705 ECHO EXAM OF ABDOMEN: CPT | Mod: 26

## 2018-03-06 PROCEDURE — 93306 TTE W/DOPPLER COMPLETE: CPT | Mod: 26

## 2018-03-06 RX ORDER — POTASSIUM CHLORIDE 20 MEQ
40 PACKET (EA) ORAL ONCE
Qty: 0 | Refills: 0 | Status: COMPLETED | OUTPATIENT
Start: 2018-03-06 | End: 2018-03-06

## 2018-03-06 RX ORDER — DOCUSATE SODIUM 100 MG
100 CAPSULE ORAL
Qty: 0 | Refills: 0 | Status: DISCONTINUED | OUTPATIENT
Start: 2018-03-06 | End: 2018-03-08

## 2018-03-06 RX ADMIN — Medication 1 GRAM(S): at 13:05

## 2018-03-06 RX ADMIN — Medication 40 MILLIGRAM(S): at 06:26

## 2018-03-06 RX ADMIN — Medication 50 MILLIGRAM(S): at 06:26

## 2018-03-06 RX ADMIN — Medication 3: at 13:02

## 2018-03-06 RX ADMIN — Medication 30 MILLILITER(S): at 17:32

## 2018-03-06 RX ADMIN — ATORVASTATIN CALCIUM 10 MILLIGRAM(S): 80 TABLET, FILM COATED ORAL at 22:02

## 2018-03-06 RX ADMIN — Medication 100 MILLIGRAM(S): at 22:03

## 2018-03-06 RX ADMIN — Medication 1: at 09:33

## 2018-03-06 RX ADMIN — Medication 1 GRAM(S): at 06:26

## 2018-03-06 RX ADMIN — Medication 40 MILLIEQUIVALENT(S): at 10:15

## 2018-03-06 RX ADMIN — Medication 3: at 18:11

## 2018-03-06 RX ADMIN — Medication 50 MILLIGRAM(S): at 17:31

## 2018-03-06 RX ADMIN — Medication 1 GRAM(S): at 23:01

## 2018-03-06 RX ADMIN — Medication 1 GRAM(S): at 00:51

## 2018-03-06 RX ADMIN — Medication 40 MILLIGRAM(S): at 17:28

## 2018-03-06 RX ADMIN — Medication 100 MILLIGRAM(S): at 10:23

## 2018-03-06 RX ADMIN — ENOXAPARIN SODIUM 40 MILLIGRAM(S): 100 INJECTION SUBCUTANEOUS at 13:03

## 2018-03-06 RX ADMIN — PANTOPRAZOLE SODIUM 40 MILLIGRAM(S): 20 TABLET, DELAYED RELEASE ORAL at 06:26

## 2018-03-06 RX ADMIN — LISINOPRIL 5 MILLIGRAM(S): 2.5 TABLET ORAL at 06:26

## 2018-03-06 RX ADMIN — PANTOPRAZOLE SODIUM 40 MILLIGRAM(S): 20 TABLET, DELAYED RELEASE ORAL at 17:31

## 2018-03-06 RX ADMIN — Medication 1 GRAM(S): at 17:31

## 2018-03-06 NOTE — PHYSICAL THERAPY INITIAL EVALUATION ADULT - CRITERIA FOR SKILLED THERAPEUTIC INTERVENTIONS
functional limitations in following categories/therapy frequency/anticipated discharge recommendation/predicted duration of therapy intervention/rehab potential/impairments found

## 2018-03-06 NOTE — PROGRESS NOTE ADULT - SUBJECTIVE AND OBJECTIVE BOX
Jermaine Curry MD  Interventional Cardiology  [place] Office : [phone] [place] [id number]  Tel:   [place] Office : [address] [facility] [place] [address]  Tel: [phone]  Cell : [phone]    Subjective : Pt lying in bed comfortable, not in distress, SOB iproving  	  MEDICATIONS:  enoxaparin Injectable 40 milliGRAM(s) SubCutaneous daily  furosemide   Injectable 40 milliGRAM(s) IV Push two times a day  lisinopril 5 milliGRAM(s) Oral daily  metoprolol     tartrate 50 milliGRAM(s) Oral two times a day          aluminum hydroxide/magnesium hydroxide/simethicone Suspension 30 milliLiter(s) Oral every 6 hours PRN  docusate sodium 100 milliGRAM(s) Oral two times a day PRN  pantoprazole    Tablet 40 milliGRAM(s) Oral two times a day before meals  sucralfate 1 Gram(s) Oral four times a day    atorvastatin 10 milliGRAM(s) Oral at bedtime  dextrose 50% Injectable 12.5 Gram(s) IV Push once  dextrose 50% Injectable 25 Gram(s) IV Push once  dextrose 50% Injectable 25 Gram(s) IV Push once  dextrose Gel 1 Dose(s) Oral once PRN  glucagon  Injectable 1 milliGRAM(s) IntraMuscular once PRN  insulin lispro (HumaLOG) corrective regimen sliding scale   SubCutaneous three times a day before meals  insulin lispro (HumaLOG) corrective regimen sliding scale   SubCutaneous at bedtime    dextrose 5%. 1000 milliLiter(s) IV Continuous <Continuous>      PHYSICAL EXAM:  T(C): 36.4 (03-06-18 @ 20:02), Max: 36.8 (03-06-18 @ 17:26)  HR: 64 (03-06-18 @ 20:02) (63 - 72)  BP: 116/61 (03-06-18 @ 20:02) (116/61 - 134/77)  RR: 18 (03-06-18 @ 20:02) (18 - 18)  SpO2: 99% (03-06-18 @ 20:02) (99% - 100%)  Wt(kg): --  I&O's Summary    05 Mar 2018 07:01  -  06 Mar 2018 07:00  --------------------------------------------------------  IN: 180 mL / OUT: 0 mL / NET: 180 mL    06 Mar 2018 07:01  -  06 Mar 2018 22:40  --------------------------------------------------------  IN: 780 mL / OUT: 1300 mL / NET: -520 mL      Height (cm): 167.64 (03-06 @ 06:23)  Weight (kg): 65.2 (03-06 @ 06:23)  BMI (kg/m2): 23.2 (03-06 @ 06:23)  BSA (m2): 1.74 (03-06 @ 06:23)    Appearance: Normal	  HEENT:   Normal oral mucosa, PERRL, EOMI	  Cardiovascular: Normal S1 S2, No JVD, No murmurs, No edema  Respiratory: Lungs clear to auscultation	  Gastrointestinal:  Soft, Non-tender, + BS	  Extremities: Normal range of motion, No clubbing, cyanosis or edema                                    9.3    4.49  )-----------( 118      ( 06 Mar 2018 05:23 )             29.1     03-06    137  |  99  |  27<H>  ----------------------------<  121<H>  3.7   |  25  |  1.22    Ca    8.4      06 Mar 2018 05:23  Phos  4.2     03-06  Mg     1.8     03-06      proBNP:   Lipid Profile:   HgA1c:   TSH:

## 2018-03-06 NOTE — PROGRESS NOTE ADULT - SUBJECTIVE AND OBJECTIVE BOX
Dr. Sue (Nephrology)  Office [phone]  Cell [phone]  Tierney MITCHELL  Cell [phone]      Patient is a 83y old  Female who presents with a chief complaint of chest pain (04 Mar 2018 16:36)      Patient seen and examined at bedside. No chest pain/sob    VITALS:  T(F): 97.6 (03-06-18 @ 06:23), Max: 98.3 (03-05-18 @ 17:30)  HR: 63 (03-06-18 @ 06:23)  BP: 134/77 (03-06-18 @ 06:23)  RR: 18 (03-06-18 @ 06:23)  SpO2: 100% (03-06-18 @ 06:23)  Wt(kg): --    03-05 @ 07:01  -  03-06 @ 07:00  --------------------------------------------------------  IN: 180 mL / OUT: 0 mL / NET: 180 mL    03-06 @ 07:01  -  03-06 @ 11:33  --------------------------------------------------------  IN: 420 mL / OUT: 400 mL / NET: 20 mL      Height (cm): 167.64 (03-06 @ 06:23)  Weight (kg): 65.2 (03-06 @ 06:23)  BMI (kg/m2): 23.2 (03-06 @ 06:23)  BSA (m2): 1.74 (03-06 @ 06:23)    PHYSICAL EXAM:  Constitutional: NAD  Neck: No JVD  Respiratory: CTAB, no wheezes, rales or rhonchi  Cardiovascular: S1, S2, RRR  Gastrointestinal: BS+, soft, NT/ND  Extremities: No peripheral edema    Hospital Medications:   MEDICATIONS  (STANDING):  atorvastatin 10 milliGRAM(s) Oral at bedtime  dextrose 5%. 1000 milliLiter(s) (50 mL/Hr) IV Continuous <Continuous>  dextrose 50% Injectable 12.5 Gram(s) IV Push once  dextrose 50% Injectable 25 Gram(s) IV Push once  dextrose 50% Injectable 25 Gram(s) IV Push once  enoxaparin Injectable 40 milliGRAM(s) SubCutaneous daily  furosemide   Injectable 40 milliGRAM(s) IV Push two times a day  insulin lispro (HumaLOG) corrective regimen sliding scale   SubCutaneous three times a day before meals  insulin lispro (HumaLOG) corrective regimen sliding scale   SubCutaneous at bedtime  lisinopril 5 milliGRAM(s) Oral daily  metoprolol     tartrate 50 milliGRAM(s) Oral two times a day  pantoprazole    Tablet 40 milliGRAM(s) Oral two times a day before meals  sucralfate 1 Gram(s) Oral four times a day      LABS:  03-06    137  |  99  |  27<H>  ----------------------------<  121<H>  3.7   |  25  |  1.22    Ca    8.4      06 Mar 2018 05:23  Phos  4.2     03-06  Mg     1.8     03-06      Creatinine Trend: 1.22 <--, 1.03 <--, 1.04 <--    Phosphorus Level, Serum: 4.2 mg/dL (03-06 @ 05:23)  Iron Total, Serum: 24 ug/dL (03-06 @ 05:23)  Ferritin, Serum: 32.75 ng/mL (03-06 @ 05:23)    calcium--  intact pth--  parathyroid hormone intact, serum99.49                            9.3    4.49  )-----------( 118      ( 06 Mar 2018 05:23 )             29.1     Urine Studies:  Urinalysis - [03-05-18 @ 14:37]      Color COLORL / Appearance CLEAR / SG 1.012 / pH 6.0      Gluc NEGATIVE / Ketone NEGATIVE  / Bili NEGATIVE / Urobili NORMAL       Blood NEGATIVE / Protein 20 / Leuk Est SMALL / Nitrite NEGATIVE      RBC 0-2 / WBC 5-10 / Hyaline 2-5 / Gran  / Sq Epi OCC / Non Sq Epi  / Bacteria     Urine Sodium 64      [03-05-18 @ 14:37]  Urine Osmolality 277      [03-05-18 @ 14:37]    Iron 24, TIBC 297, %sat --      [03-06-18 @ 05:23]  Ferritin 32.75      [03-06-18 @ 05:23]  PTH 99.49 (Ca --)      [03-06-18 @ 05:23]   --  HbA1c 7.6      [03-05-18 @ 05:15]        RADIOLOGY & ADDITIONAL STUDIES:

## 2018-03-06 NOTE — PHYSICAL THERAPY INITIAL EVALUATION ADULT - ADDITIONAL COMMENTS
Pt. reports owning DME of straight cane, rolling walker.     Pt. was left supine in bed, NAD. ARVIND Sorenson present and aware of pt. status and participaiton in PT.

## 2018-03-06 NOTE — PHYSICAL THERAPY INITIAL EVALUATION ADULT - PERTINENT HX OF CURRENT PROBLEM, REHAB EVAL
Pt. admitted for epigastric pain. Pain occurs with ambulating and sitting up with some SOB. Acute on chronic CHF.

## 2018-03-06 NOTE — PROGRESS NOTE ADULT - SUBJECTIVE AND OBJECTIVE BOX
TANO BATISTA:[id number],   83yFemale followed for:  No Known Allergies    PAST MEDICAL & SURGICAL HISTORY:  Heart failure, systolic, due to CAD  DM (diabetes mellitus)  HLD (hyperlipidemia)  HTN (hypertension)  Aortic valve replaced    FAMILY HISTORY:    MEDICATIONS  (STANDING):  atorvastatin 10 milliGRAM(s) Oral at bedtime  dextrose 5%. 1000 milliLiter(s) (50 mL/Hr) IV Continuous <Continuous>  dextrose 50% Injectable 12.5 Gram(s) IV Push once  dextrose 50% Injectable 25 Gram(s) IV Push once  dextrose 50% Injectable 25 Gram(s) IV Push once  enoxaparin Injectable 40 milliGRAM(s) SubCutaneous daily  furosemide   Injectable 40 milliGRAM(s) IV Push two times a day  insulin lispro (HumaLOG) corrective regimen sliding scale   SubCutaneous three times a day before meals  insulin lispro (HumaLOG) corrective regimen sliding scale   SubCutaneous at bedtime  lisinopril 5 milliGRAM(s) Oral daily  metoprolol     tartrate 50 milliGRAM(s) Oral two times a day  pantoprazole    Tablet 40 milliGRAM(s) Oral two times a day before meals  sucralfate 1 Gram(s) Oral four times a day    MEDICATIONS  (PRN):  dextrose Gel 1 Dose(s) Oral once PRN Blood Glucose LESS THAN 70 milliGRAM(s)/deciliter  glucagon  Injectable 1 milliGRAM(s) IntraMuscular once PRN Glucose LESS THAN 70 milligrams/deciliter      Vital Signs Last 24 Hrs  T(C): 36.4 (06 Mar 2018 06:23), Max: 36.8 (05 Mar 2018 17:30)  T(F): 97.6 (06 Mar 2018 06:23), Max: 98.3 (05 Mar 2018 17:30)  HR: 63 (06 Mar 2018 06:23) (63 - 78)  BP: 134/77 (06 Mar 2018 06:23) (112/58 - 134/77)  BP(mean): --  RR: 18 (06 Mar 2018 06:23) (18 - 23)  SpO2: 100% (06 Mar 2018 06:23) (98% - 100%)  nc/at  s1s2  cta  soft, nt, nd no guarding or rebound  no c/c/e    CBC Full  -  ( 06 Mar 2018 05:23 )  WBC Count : 4.49 K/uL  Hemoglobin : 9.3 g/dL  Hematocrit : 29.1 %  Platelet Count - Automated : 118 K/uL  Mean Cell Volume : 79.1 fL  Mean Cell Hemoglobin : 25.3 pg  Mean Cell Hemoglobin Concentration : 32.0 %  Auto Neutrophil # : x  Auto Lymphocyte # : x  Auto Monocyte # : x  Auto Eosinophil # : x  Auto Basophil # : x  Auto Neutrophil % : x  Auto Lymphocyte % : x  Auto Monocyte % : x  Auto Eosinophil % : x  Auto Basophil % : x    03-06    137  |  99  |  27<H>  ----------------------------<  121<H>  3.7   |  25  |  1.22    Ca    8.4      06 Mar 2018 05:23  Phos  4.2     03-06  Mg     1.8     03-06    TPro  7.8  /  Alb  3.6  /  TBili  0.6  /  DBili  x   /  AST  31  /  ALT  16  /  AlkPhos  87  03-04    PT/INR - ( 04 Mar 2018 09:45 )   PT: 14.1 SEC;   INR: 1.26          PTT - ( 04 Mar 2018 09:45 )  PTT:30.8 SEC

## 2018-03-06 NOTE — DIETITIAN INITIAL EVALUATION ADULT. - OTHER INFO
Pt seen for RD consult, 82 y/o female admitted with the DX of heart failure, HTN, HLD, DM reports fair to good po, with weight gain of 5 # in last week related with interstitial edema ,on lasix. No significant dry weight changes reported recently,  Pt denies any N/V/D, Food options discussed, RD to implement. Labs reviewed, Current diet remain appropriate, Pt had no prior knowledge about [facility] diet, Nutrition education provided with written material. RD remains available.

## 2018-03-06 NOTE — PROVIDER CONTACT NOTE (OTHER) - BACKGROUND
(Admit Diagnosis) Heart failure  (PMH) Heart failure, systolic, due to CAD  (PMH) DM (diabetes mellitus)  (PMH) HTN (hypertension)

## 2018-03-06 NOTE — PROGRESS NOTE ADULT - SUBJECTIVE AND OBJECTIVE BOX
Patient is a 83y old  Female who presents with a chief complaint of chest pain (04 Mar 2018 16:36)      INTERVAL HPI/OVERNIGHT EVENTS:  T(C): 36.8 (18 @ 17:26), Max: 36.8 (18 @ 17:26)  HR: 70 (18 @ 17:26) (63 - 72)  BP: 121/56 (18 @ 17:26) (121/56 - 134/77)  RR: 18 (18 @ 15:23) (18 - 18)  SpO2: 99% (18 @ 15:23) (99% - 100%)  Wt(kg): --  I&O's Summary    05 Mar 2018 07:  -  06 Mar 2018 07:00  --------------------------------------------------------  IN: 180 mL / OUT: 0 mL / NET: 180 mL    06 Mar 2018 07:01  -  06 Mar 2018 18:49  --------------------------------------------------------  IN: 660 mL / OUT: 1000 mL / NET: -340 mL        LABS:                        9.3    4.49  )-----------( 118      ( 06 Mar 2018 05:23 )             29.1     03-06    137  |  99  |  27<H>  ----------------------------<  121<H>  3.7   |  25  |  1.22    Ca    8.4      06 Mar 2018 05:23  Phos  4.2     03-06  Mg     1.8     03-06        Urinalysis Basic - ( 05 Mar 2018 14:37 )    Color: COLORL / Appearance: CLEAR / S.012 / pH: 6.0  Gluc: NEGATIVE / Ketone: NEGATIVE  / Bili: NEGATIVE / Urobili: NORMAL mg/dL   Blood: NEGATIVE / Protein: 20 mg/dL / Nitrite: NEGATIVE   Leuk Esterase: SMALL / RBC: 0-2 / WBC 5-10   Sq Epi: OCC / Non Sq Epi: x / Bacteria: x      CAPILLARY BLOOD GLUCOSE      POCT Blood Glucose.: 277 mg/dL (06 Mar 2018 17:57)  POCT Blood Glucose.: 273 mg/dL (06 Mar 2018 12:55)  POCT Blood Glucose.: 182 mg/dL (06 Mar 2018 09:05)  POCT Blood Glucose.: 188 mg/dL (05 Mar 2018 22:12)        Urinalysis Basic - ( 05 Mar 2018 14:37 )    Color: COLORL / Appearance: CLEAR / S.012 / pH: 6.0  Gluc: NEGATIVE / Ketone: NEGATIVE  / Bili: NEGATIVE / Urobili: NORMAL mg/dL   Blood: NEGATIVE / Protein: 20 mg/dL / Nitrite: NEGATIVE   Leuk Esterase: SMALL / RBC: 0-2 / WBC 5-10   Sq Epi: OCC / Non Sq Epi: x / Bacteria: x        MEDICATIONS  (STANDING):  atorvastatin 10 milliGRAM(s) Oral at bedtime  dextrose 5%. 1000 milliLiter(s) (50 mL/Hr) IV Continuous <Continuous>  dextrose 50% Injectable 12.5 Gram(s) IV Push once  dextrose 50% Injectable 25 Gram(s) IV Push once  dextrose 50% Injectable 25 Gram(s) IV Push once  enoxaparin Injectable 40 milliGRAM(s) SubCutaneous daily  furosemide   Injectable 40 milliGRAM(s) IV Push two times a day  insulin lispro (HumaLOG) corrective regimen sliding scale   SubCutaneous three times a day before meals  insulin lispro (HumaLOG) corrective regimen sliding scale   SubCutaneous at bedtime  lisinopril 5 milliGRAM(s) Oral daily  metoprolol     tartrate 50 milliGRAM(s) Oral two times a day  pantoprazole    Tablet 40 milliGRAM(s) Oral two times a day before meals  sucralfate 1 Gram(s) Oral four times a day    MEDICATIONS  (PRN):  aluminum hydroxide/magnesium hydroxide/simethicone Suspension 30 milliLiter(s) Oral every 6 hours PRN Dyspepsia  dextrose Gel 1 Dose(s) Oral once PRN Blood Glucose LESS THAN 70 milliGRAM(s)/deciliter  docusate sodium 100 milliGRAM(s) Oral two times a day PRN Constipation  glucagon  Injectable 1 milliGRAM(s) IntraMuscular once PRN Glucose LESS THAN 70 milligrams/deciliter          PHYSICAL EXAM:  GENERAL: NAD, well-groomed, well-developed  HEAD:  Atraumatic, Normocephalic  CHEST/LUNG: Clear to percussion bilaterally; No rales, rhonchi, wheezing, or rubs  HEART: Regular rate and rhythm; No murmurs, rubs, or gallops  ABDOMEN: Soft, Nontender, Nondistended; Bowel sounds present  EXTREMITIES:  2+ Peripheral Pulses, No clubbing, cyanosis, or edema  LYMPH: No lymphadenopathy noted  SKIN: No rashes or lesions    Care Discussed with Consultants/Other Providers [ ] YES  [ ] NO

## 2018-03-07 LAB
BUN SERPL-MCNC: 26 MG/DL — HIGH (ref 7–23)
CALCIUM SERPL-MCNC: 8.9 MG/DL — SIGNIFICANT CHANGE UP (ref 8.4–10.5)
CHLORIDE SERPL-SCNC: 97 MMOL/L — LOW (ref 98–107)
CO2 SERPL-SCNC: 25 MMOL/L — SIGNIFICANT CHANGE UP (ref 22–31)
CREAT SERPL-MCNC: 1.31 MG/DL — HIGH (ref 0.5–1.3)
GLUCOSE SERPL-MCNC: 159 MG/DL — HIGH (ref 70–99)
HCT VFR BLD CALC: 32.5 % — LOW (ref 34.5–45)
HGB BLD-MCNC: 10.4 G/DL — LOW (ref 11.5–15.5)
MAGNESIUM SERPL-MCNC: 2 MG/DL — SIGNIFICANT CHANGE UP (ref 1.6–2.6)
MCHC RBC-ENTMCNC: 26.2 PG — LOW (ref 27–34)
MCHC RBC-ENTMCNC: 32 % — SIGNIFICANT CHANGE UP (ref 32–36)
MCV RBC AUTO: 81.9 FL — SIGNIFICANT CHANGE UP (ref 80–100)
NRBC # FLD: 0 — SIGNIFICANT CHANGE UP
PLATELET # BLD AUTO: 147 K/UL — LOW (ref 150–400)
PMV BLD: 11.2 FL — SIGNIFICANT CHANGE UP (ref 7–13)
POTASSIUM SERPL-MCNC: 4.2 MMOL/L — SIGNIFICANT CHANGE UP (ref 3.5–5.3)
POTASSIUM SERPL-SCNC: 4.2 MMOL/L — SIGNIFICANT CHANGE UP (ref 3.5–5.3)
RBC # BLD: 3.97 M/UL — SIGNIFICANT CHANGE UP (ref 3.8–5.2)
RBC # FLD: 16.8 % — HIGH (ref 10.3–14.5)
SODIUM SERPL-SCNC: 138 MMOL/L — SIGNIFICANT CHANGE UP (ref 135–145)
WBC # BLD: 4.99 K/UL — SIGNIFICANT CHANGE UP (ref 3.8–10.5)
WBC # FLD AUTO: 4.99 K/UL — SIGNIFICANT CHANGE UP (ref 3.8–10.5)

## 2018-03-07 RX ORDER — CALCITRIOL 0.5 UG/1
0.25 CAPSULE ORAL DAILY
Qty: 0 | Refills: 0 | Status: DISCONTINUED | OUTPATIENT
Start: 2018-03-07 | End: 2018-03-08

## 2018-03-07 RX ORDER — FUROSEMIDE 40 MG
40 TABLET ORAL DAILY
Qty: 0 | Refills: 0 | Status: DISCONTINUED | OUTPATIENT
Start: 2018-03-07 | End: 2018-03-08

## 2018-03-07 RX ADMIN — Medication 50 MILLIGRAM(S): at 17:02

## 2018-03-07 RX ADMIN — PANTOPRAZOLE SODIUM 40 MILLIGRAM(S): 20 TABLET, DELAYED RELEASE ORAL at 17:02

## 2018-03-07 RX ADMIN — ENOXAPARIN SODIUM 40 MILLIGRAM(S): 100 INJECTION SUBCUTANEOUS at 13:29

## 2018-03-07 RX ADMIN — CALCITRIOL 0.25 MICROGRAM(S): 0.5 CAPSULE ORAL at 13:29

## 2018-03-07 RX ADMIN — Medication 1: at 13:29

## 2018-03-07 RX ADMIN — Medication 2: at 18:05

## 2018-03-07 RX ADMIN — Medication 1 GRAM(S): at 17:01

## 2018-03-07 RX ADMIN — Medication 50 MILLIGRAM(S): at 06:12

## 2018-03-07 RX ADMIN — PANTOPRAZOLE SODIUM 40 MILLIGRAM(S): 20 TABLET, DELAYED RELEASE ORAL at 06:12

## 2018-03-07 RX ADMIN — LISINOPRIL 5 MILLIGRAM(S): 2.5 TABLET ORAL at 06:12

## 2018-03-07 RX ADMIN — Medication 40 MILLIGRAM(S): at 06:12

## 2018-03-07 RX ADMIN — ATORVASTATIN CALCIUM 10 MILLIGRAM(S): 80 TABLET, FILM COATED ORAL at 21:44

## 2018-03-07 RX ADMIN — Medication 1 GRAM(S): at 06:12

## 2018-03-07 RX ADMIN — Medication 1 GRAM(S): at 13:29

## 2018-03-07 RX ADMIN — Medication 2: at 08:58

## 2018-03-07 RX ADMIN — Medication 1 GRAM(S): at 21:45

## 2018-03-07 NOTE — PROGRESS NOTE ADULT - SUBJECTIVE AND OBJECTIVE BOX
Jermaine Curry MD  Interventional Cardiology  [place] Office : [phone] [place] [id number]  Tel:   [place] Office : [address] [facility] [place] [address]  Tel: [phone]  Cell : [phone]    Subjective : Pt lying in bed comfortable, not in distress, denies any chest pain or SOB  	  MEDICATIONS:  enoxaparin Injectable 40 milliGRAM(s) SubCutaneous daily  furosemide   Injectable 40 milliGRAM(s) IV Push two times a day  lisinopril 5 milliGRAM(s) Oral daily  metoprolol     tartrate 50 milliGRAM(s) Oral two times a day  aluminum hydroxide/magnesium hydroxide/simethicone Suspension 30 milliLiter(s) Oral every 6 hours PRN  docusate sodium 100 milliGRAM(s) Oral two times a day PRN  pantoprazole    Tablet 40 milliGRAM(s) Oral two times a day before meals  sucralfate 1 Gram(s) Oral four times a day  atorvastatin 10 milliGRAM(s) Oral at bedtime  dextrose 50% Injectable 12.5 Gram(s) IV Push once  dextrose 50% Injectable 25 Gram(s) IV Push once  dextrose 50% Injectable 25 Gram(s) IV Push once  dextrose Gel 1 Dose(s) Oral once PRN  glucagon  Injectable 1 milliGRAM(s) IntraMuscular once PRN  insulin lispro (HumaLOG) corrective regimen sliding scale   SubCutaneous three times a day before meals  insulin lispro (HumaLOG) corrective regimen sliding scale   SubCutaneous at bedtime    calcitriol   Capsule 0.25 MICROGram(s) Oral daily  dextrose 5%. 1000 milliLiter(s) IV Continuous <Continuous>      PHYSICAL EXAM:  T(C): 36.4 (03-07-18 @ 12:37), Max: 36.8 (03-06-18 @ 17:26)  HR: 71 (03-07-18 @ 12:37) (64 - 71)  BP: 129/88 (03-07-18 @ 12:37) (116/61 - 133/82)  RR: 17 (03-07-18 @ 12:37) (17 - 18)  SpO2: 100% (03-07-18 @ 12:37) (96% - 100%)  Wt(kg): --  I&O's Summary    06 Mar 2018 07:01  -  07 Mar 2018 07:00  --------------------------------------------------------  IN: 860 mL / OUT: 1900 mL / NET: -1040 mL    07 Mar 2018 07:01  -  07 Mar 2018 16:22  --------------------------------------------------------  IN: 120 mL / OUT: 801 mL / NET: -681 mL          Appearance: Normal	  HEENT:   Normal oral mucosa, PERRL, EOMI	  Cardiovascular: Normal S1 S2, No JVD, No murmurs, No edema  Respiratory: Lungs clear to auscultation	  Gastrointestinal:  Soft, Non-tender, + BS	  Extremities: Normal range of motion, No clubbing, cyanosis or edema                                    10.4   4.99  )-----------( 147      ( 07 Mar 2018 07:32 )             32.5     03-07    138  |  97<L>  |  26<H>  ----------------------------<  159<H>  4.2   |  25  |  1.31<H>    Ca    8.9      07 Mar 2018 07:32  Phos  4.2     03-06  Mg     2.0     03-07      proBNP:   Lipid Profile:   HgA1c:   TSH:

## 2018-03-07 NOTE — PROGRESS NOTE ADULT - SUBJECTIVE AND OBJECTIVE BOX
TANO BATISTA:[id number],   83yFemale followed for:  No Known Allergies    PAST MEDICAL & SURGICAL HISTORY:  Heart failure, systolic, due to CAD  DM (diabetes mellitus)  HLD (hyperlipidemia)  HTN (hypertension)  Aortic valve replaced    FAMILY HISTORY:    MEDICATIONS  (STANDING):  atorvastatin 10 milliGRAM(s) Oral at bedtime  dextrose 5%. 1000 milliLiter(s) (50 mL/Hr) IV Continuous <Continuous>  dextrose 50% Injectable 12.5 Gram(s) IV Push once  dextrose 50% Injectable 25 Gram(s) IV Push once  dextrose 50% Injectable 25 Gram(s) IV Push once  enoxaparin Injectable 40 milliGRAM(s) SubCutaneous daily  furosemide   Injectable 40 milliGRAM(s) IV Push two times a day  insulin lispro (HumaLOG) corrective regimen sliding scale   SubCutaneous three times a day before meals  insulin lispro (HumaLOG) corrective regimen sliding scale   SubCutaneous at bedtime  lisinopril 5 milliGRAM(s) Oral daily  metoprolol     tartrate 50 milliGRAM(s) Oral two times a day  pantoprazole    Tablet 40 milliGRAM(s) Oral two times a day before meals  sucralfate 1 Gram(s) Oral four times a day    MEDICATIONS  (PRN):  aluminum hydroxide/magnesium hydroxide/simethicone Suspension 30 milliLiter(s) Oral every 6 hours PRN Dyspepsia  dextrose Gel 1 Dose(s) Oral once PRN Blood Glucose LESS THAN 70 milliGRAM(s)/deciliter  docusate sodium 100 milliGRAM(s) Oral two times a day PRN Constipation  glucagon  Injectable 1 milliGRAM(s) IntraMuscular once PRN Glucose LESS THAN 70 milligrams/deciliter      Vital Signs Last 24 Hrs  T(C): 36.6 (07 Mar 2018 06:06), Max: 36.8 (06 Mar 2018 17:26)  T(F): 97.8 (07 Mar 2018 06:06), Max: 98.2 (06 Mar 2018 17:26)  HR: 67 (07 Mar 2018 06:06) (64 - 72)  BP: 133/82 (07 Mar 2018 06:06) (116/61 - 133/82)  BP(mean): --  RR: 18 (07 Mar 2018 06:06) (18 - 18)  SpO2: 96% (07 Mar 2018 06:06) (96% - 99%)  nc/at  s1s2  cta  soft, nt, nd no guarding or rebound  no c/c/e    CBC Full  -  ( 07 Mar 2018 07:32 )  WBC Count : 4.99 K/uL  Hemoglobin : 10.4 g/dL  Hematocrit : 32.5 %  Platelet Count - Automated : 147 K/uL  Mean Cell Volume : 81.9 fL  Mean Cell Hemoglobin : 26.2 pg  Mean Cell Hemoglobin Concentration : 32.0 %  Auto Neutrophil # : x  Auto Lymphocyte # : x  Auto Monocyte # : x  Auto Eosinophil # : x  Auto Basophil # : x  Auto Neutrophil % : x  Auto Lymphocyte % : x  Auto Monocyte % : x  Auto Eosinophil % : x  Auto Basophil % : x    03-07    138  |  97<L>  |  26<H>  ----------------------------<  159<H>  4.2   |  25  |  1.31<H>    Ca    8.9      07 Mar 2018 07:32  Phos  4.2     03-06  Mg     2.0     03-07

## 2018-03-07 NOTE — PROGRESS NOTE ADULT - SUBJECTIVE AND OBJECTIVE BOX
Patient is a 83y old  Female who presents with a chief complaint of chest pain (04 Mar 2018 16:36)      INTERVAL HPI/OVERNIGHT EVENTS:  T(C): 36.8 (03-07-18 @ 17:00), Max: 36.8 (03-07-18 @ 17:00)  HR: 74 (03-07-18 @ 17:00) (64 - 74)  BP: 118/647 (03-07-18 @ 17:00) (116/61 - 133/82)  RR: 18 (03-07-18 @ 17:00) (17 - 18)  SpO2: 100% (03-07-18 @ 17:00) (96% - 100%)  Wt(kg): --  I&O's Summary    06 Mar 2018 07:01  -  07 Mar 2018 07:00  --------------------------------------------------------  IN: 860 mL / OUT: 1900 mL / NET: -1040 mL    07 Mar 2018 07:01  -  07 Mar 2018 17:35  --------------------------------------------------------  IN: 120 mL / OUT: 801 mL / NET: -681 mL        LABS:                        10.4   4.99  )-----------( 147      ( 07 Mar 2018 07:32 )             32.5     03-07    138  |  97<L>  |  26<H>  ----------------------------<  159<H>  4.2   |  25  |  1.31<H>    Ca    8.9      07 Mar 2018 07:32  Phos  4.2     03-06  Mg     2.0     03-07          CAPILLARY BLOOD GLUCOSE      POCT Blood Glucose.: 190 mg/dL (07 Mar 2018 12:43)  POCT Blood Glucose.: 222 mg/dL (07 Mar 2018 08:50)  POCT Blood Glucose.: 155 mg/dL (06 Mar 2018 21:56)  POCT Blood Glucose.: 277 mg/dL (06 Mar 2018 17:57)            MEDICATIONS  (STANDING):  atorvastatin 10 milliGRAM(s) Oral at bedtime  calcitriol   Capsule 0.25 MICROGram(s) Oral daily  dextrose 5%. 1000 milliLiter(s) (50 mL/Hr) IV Continuous <Continuous>  dextrose 50% Injectable 12.5 Gram(s) IV Push once  dextrose 50% Injectable 25 Gram(s) IV Push once  dextrose 50% Injectable 25 Gram(s) IV Push once  enoxaparin Injectable 40 milliGRAM(s) SubCutaneous daily  furosemide    Tablet 40 milliGRAM(s) Oral daily  insulin lispro (HumaLOG) corrective regimen sliding scale   SubCutaneous three times a day before meals  insulin lispro (HumaLOG) corrective regimen sliding scale   SubCutaneous at bedtime  lisinopril 5 milliGRAM(s) Oral daily  metoprolol     tartrate 50 milliGRAM(s) Oral two times a day  pantoprazole    Tablet 40 milliGRAM(s) Oral two times a day before meals  sucralfate 1 Gram(s) Oral four times a day    MEDICATIONS  (PRN):  aluminum hydroxide/magnesium hydroxide/simethicone Suspension 30 milliLiter(s) Oral every 6 hours PRN Dyspepsia  dextrose Gel 1 Dose(s) Oral once PRN Blood Glucose LESS THAN 70 milliGRAM(s)/deciliter  docusate sodium 100 milliGRAM(s) Oral two times a day PRN Constipation  glucagon  Injectable 1 milliGRAM(s) IntraMuscular once PRN Glucose LESS THAN 70 milligrams/deciliter          PHYSICAL EXAM:  GENERAL: NAD, well-groomed, well-developed  HEAD:  Atraumatic, Normocephalic  CHEST/LUNG: Clear to percussion bilaterally; No rales, rhonchi, wheezing, or rubs  HEART: Regular rate and rhythm; No murmurs, rubs, or gallops  ABDOMEN: Soft, Nontender, Nondistended; Bowel sounds present  EXTREMITIES:  2+ Peripheral Pulses, No clubbing, cyanosis, or edema  LYMPH: No lymphadenopathy noted  SKIN: No rashes or lesions    Care Discussed with Consultants/Other Providers [ ] YES  [ ] NO

## 2018-03-07 NOTE — PROGRESS NOTE ADULT - PROBLEM SELECTOR PLAN 2
slightly worsen today likely sec to diuresing. still seems to be fluid overloaded. need to continue diuresing

## 2018-03-07 NOTE — PROGRESS NOTE ADULT - SUBJECTIVE AND OBJECTIVE BOX
Dr. Sue (Nephrology)  Office [phone]  Cell [phone]  Tierney MITCHELL  Cell [phone]      Patient is a 83y old  Female who presents with a chief complaint of chest pain (04 Mar 2018 16:36)      Patient seen and examined at bedside. No chest pain/sob    VITALS:  T(F): 97.8 (03-07-18 @ 06:06), Max: 98.2 (03-06-18 @ 17:26)  HR: 67 (03-07-18 @ 06:06)  BP: 133/82 (03-07-18 @ 06:06)  RR: 18 (03-07-18 @ 06:06)  SpO2: 96% (03-07-18 @ 06:06)  Wt(kg): --    03-06 @ 07:01  -  03-07 @ 07:00  --------------------------------------------------------  IN: 860 mL / OUT: 1900 mL / NET: -1040 mL    03-07 @ 07:01  -  03-07 @ 11:36  --------------------------------------------------------  IN: 120 mL / OUT: 801 mL / NET: -681 mL          PHYSICAL EXAM:  Constitutional: NAD  Neck: No JVD  Respiratory: CTAB, no wheezes, rales or rhonchi  Cardiovascular: S1, S2, RRR  Gastrointestinal: BS+, soft, NT/ND  Extremities: No peripheral edema    Hospital Medications:   MEDICATIONS  (STANDING):  atorvastatin 10 milliGRAM(s) Oral at bedtime  dextrose 5%. 1000 milliLiter(s) (50 mL/Hr) IV Continuous <Continuous>  dextrose 50% Injectable 12.5 Gram(s) IV Push once  dextrose 50% Injectable 25 Gram(s) IV Push once  dextrose 50% Injectable 25 Gram(s) IV Push once  enoxaparin Injectable 40 milliGRAM(s) SubCutaneous daily  furosemide   Injectable 40 milliGRAM(s) IV Push two times a day  insulin lispro (HumaLOG) corrective regimen sliding scale   SubCutaneous three times a day before meals  insulin lispro (HumaLOG) corrective regimen sliding scale   SubCutaneous at bedtime  lisinopril 5 milliGRAM(s) Oral daily  metoprolol     tartrate 50 milliGRAM(s) Oral two times a day  pantoprazole    Tablet 40 milliGRAM(s) Oral two times a day before meals  sucralfate 1 Gram(s) Oral four times a day      LABS:  03-07    138  |  97<L>  |  26<H>  ----------------------------<  159<H>  4.2   |  25  |  1.31<H>    Ca    8.9      07 Mar 2018 07:32  Phos  4.2     03-06  Mg     2.0     03-07      Creatinine Trend: 1.31 <--, 1.22 <--, 1.03 <--, 1.04 <--                                10.4   4.99  )-----------( 147      ( 07 Mar 2018 07:32 )             32.5     Urine Studies:  Urinalysis - [03-05-18 @ 14:37]      Color COLORL / Appearance CLEAR / SG 1.012 / pH 6.0      Gluc NEGATIVE / Ketone NEGATIVE  / Bili NEGATIVE / Urobili NORMAL       Blood NEGATIVE / Protein 20 / Leuk Est SMALL / Nitrite NEGATIVE      RBC 0-2 / WBC 5-10 / Hyaline 2-5 / Gran  / Sq Epi OCC / Non Sq Epi  / Bacteria     Urine Sodium 64      [03-05-18 @ 14:37]  Urine Osmolality 277      [03-05-18 @ 14:37]    Iron 24, TIBC 297, %sat --      [03-06-18 @ 05:23]  Ferritin 32.75      [03-06-18 @ 05:23]  PTH 99.49 (Ca --)      [03-06-18 @ 05:23]   --  Vitamin D (25OH) 34.3      [03-06-18 @ 05:23]  HbA1c 7.6      [03-05-18 @ 05:15]        RADIOLOGY & ADDITIONAL STUDIES: Dr. Sue (Nephrology)  Office [phone]  Cell [phone]  Tierney MITCHELL  Cell [phone]      Patient is a 83y old  Female who presents with a chief complaint of chest pain (04 Mar 2018 16:36)      Patient seen and examined at bedside. No chest pain/sob    VITALS:  T(F): 97.8 (03-07-18 @ 06:06), Max: 98.2 (03-06-18 @ 17:26)  HR: 67 (03-07-18 @ 06:06)  BP: 133/82 (03-07-18 @ 06:06)  RR: 18 (03-07-18 @ 06:06)  SpO2: 96% (03-07-18 @ 06:06)  Wt(kg): --    03-06 @ 07:01  -  03-07 @ 07:00  --------------------------------------------------------  IN: 860 mL / OUT: 1900 mL / NET: -1040 mL    03-07 @ 07:01  -  03-07 @ 11:36  --------------------------------------------------------  IN: 120 mL / OUT: 801 mL / NET: -681 mL          PHYSICAL EXAM:  Constitutional: NAD  Neck: No JVD  Respiratory: b/l basilar crackles  Cardiovascular: S1, S2, RRR  Gastrointestinal: BS+, soft, NT/ND  Extremities: No peripheral edema    Hospital Medications:   MEDICATIONS  (STANDING):  atorvastatin 10 milliGRAM(s) Oral at bedtime  dextrose 5%. 1000 milliLiter(s) (50 mL/Hr) IV Continuous <Continuous>  dextrose 50% Injectable 12.5 Gram(s) IV Push once  dextrose 50% Injectable 25 Gram(s) IV Push once  dextrose 50% Injectable 25 Gram(s) IV Push once  enoxaparin Injectable 40 milliGRAM(s) SubCutaneous daily  furosemide   Injectable 40 milliGRAM(s) IV Push two times a day  insulin lispro (HumaLOG) corrective regimen sliding scale   SubCutaneous three times a day before meals  insulin lispro (HumaLOG) corrective regimen sliding scale   SubCutaneous at bedtime  lisinopril 5 milliGRAM(s) Oral daily  metoprolol     tartrate 50 milliGRAM(s) Oral two times a day  pantoprazole    Tablet 40 milliGRAM(s) Oral two times a day before meals  sucralfate 1 Gram(s) Oral four times a day      LABS:  03-07    138  |  97<L>  |  26<H>  ----------------------------<  159<H>  4.2   |  25  |  1.31<H>    Ca    8.9      07 Mar 2018 07:32  Phos  4.2     03-06  Mg     2.0     03-07      Creatinine Trend: 1.31 <--, 1.22 <--, 1.03 <--, 1.04 <--                                10.4   4.99  )-----------( 147      ( 07 Mar 2018 07:32 )             32.5     Urine Studies:  Urinalysis - [03-05-18 @ 14:37]      Color COLORL / Appearance CLEAR / SG 1.012 / pH 6.0      Gluc NEGATIVE / Ketone NEGATIVE  / Bili NEGATIVE / Urobili NORMAL       Blood NEGATIVE / Protein 20 / Leuk Est SMALL / Nitrite NEGATIVE      RBC 0-2 / WBC 5-10 / Hyaline 2-5 / Gran  / Sq Epi OCC / Non Sq Epi  / Bacteria     Urine Sodium 64      [03-05-18 @ 14:37]  Urine Osmolality 277      [03-05-18 @ 14:37]    Iron 24, TIBC 297, %sat --      [03-06-18 @ 05:23]  Ferritin 32.75      [03-06-18 @ 05:23]  PTH 99.49 (Ca --)      [03-06-18 @ 05:23]   --  Vitamin D (25OH) 34.3      [03-06-18 @ 05:23]  HbA1c 7.6      [03-05-18 @ 05:15]        RADIOLOGY & ADDITIONAL STUDIES:

## 2018-03-08 ENCOUNTER — TRANSCRIPTION ENCOUNTER (OUTPATIENT)
Age: 83
End: 2018-03-08

## 2018-03-08 VITALS
OXYGEN SATURATION: 99 % | HEART RATE: 66 BPM | RESPIRATION RATE: 18 BRPM | TEMPERATURE: 98 F | DIASTOLIC BLOOD PRESSURE: 58 MMHG | SYSTOLIC BLOOD PRESSURE: 110 MMHG

## 2018-03-08 LAB
BUN SERPL-MCNC: 25 MG/DL — HIGH (ref 7–23)
CALCIUM SERPL-MCNC: 8.9 MG/DL — SIGNIFICANT CHANGE UP (ref 8.4–10.5)
CHLORIDE SERPL-SCNC: 99 MMOL/L — SIGNIFICANT CHANGE UP (ref 98–107)
CO2 SERPL-SCNC: 26 MMOL/L — SIGNIFICANT CHANGE UP (ref 22–31)
CREAT SERPL-MCNC: 1.21 MG/DL — SIGNIFICANT CHANGE UP (ref 0.5–1.3)
GLUCOSE SERPL-MCNC: 118 MG/DL — HIGH (ref 70–99)
HCT VFR BLD CALC: 32.5 % — LOW (ref 34.5–45)
HGB BLD-MCNC: 10.4 G/DL — LOW (ref 11.5–15.5)
MAGNESIUM SERPL-MCNC: 2 MG/DL — SIGNIFICANT CHANGE UP (ref 1.6–2.6)
MCHC RBC-ENTMCNC: 26.3 PG — LOW (ref 27–34)
MCHC RBC-ENTMCNC: 32 % — SIGNIFICANT CHANGE UP (ref 32–36)
MCV RBC AUTO: 82.1 FL — SIGNIFICANT CHANGE UP (ref 80–100)
NRBC # FLD: 0 — SIGNIFICANT CHANGE UP
PLATELET # BLD AUTO: 111 K/UL — LOW (ref 150–400)
PMV BLD: 11.5 FL — SIGNIFICANT CHANGE UP (ref 7–13)
POTASSIUM SERPL-MCNC: 4.1 MMOL/L — SIGNIFICANT CHANGE UP (ref 3.5–5.3)
POTASSIUM SERPL-SCNC: 4.1 MMOL/L — SIGNIFICANT CHANGE UP (ref 3.5–5.3)
RBC # BLD: 3.96 M/UL — SIGNIFICANT CHANGE UP (ref 3.8–5.2)
RBC # FLD: 16.8 % — HIGH (ref 10.3–14.5)
SODIUM SERPL-SCNC: 137 MMOL/L — SIGNIFICANT CHANGE UP (ref 135–145)
WBC # BLD: 5.28 K/UL — SIGNIFICANT CHANGE UP (ref 3.8–10.5)
WBC # FLD AUTO: 5.28 K/UL — SIGNIFICANT CHANGE UP (ref 3.8–10.5)

## 2018-03-08 RX ORDER — CALCITRIOL 0.5 UG/1
1 CAPSULE ORAL
Qty: 30 | Refills: 0 | OUTPATIENT
Start: 2018-03-08 | End: 2018-04-06

## 2018-03-08 RX ORDER — SUCRALFATE 1 G
1 TABLET ORAL
Qty: 120 | Refills: 0 | OUTPATIENT
Start: 2018-03-08 | End: 2018-04-06

## 2018-03-08 RX ORDER — FUROSEMIDE 40 MG
20 TABLET ORAL
Qty: 0 | Refills: 0 | COMMUNITY

## 2018-03-08 RX ORDER — FUROSEMIDE 40 MG
1 TABLET ORAL
Qty: 30 | Refills: 0 | OUTPATIENT
Start: 2018-03-08 | End: 2018-04-06

## 2018-03-08 RX ADMIN — PANTOPRAZOLE SODIUM 40 MILLIGRAM(S): 20 TABLET, DELAYED RELEASE ORAL at 05:45

## 2018-03-08 RX ADMIN — Medication 1 GRAM(S): at 13:36

## 2018-03-08 RX ADMIN — Medication 4: at 13:36

## 2018-03-08 RX ADMIN — Medication 50 MILLIGRAM(S): at 05:45

## 2018-03-08 RX ADMIN — LISINOPRIL 5 MILLIGRAM(S): 2.5 TABLET ORAL at 05:45

## 2018-03-08 RX ADMIN — Medication 40 MILLIGRAM(S): at 05:45

## 2018-03-08 RX ADMIN — Medication 1 GRAM(S): at 05:45

## 2018-03-08 RX ADMIN — CALCITRIOL 0.25 MICROGRAM(S): 0.5 CAPSULE ORAL at 13:36

## 2018-03-08 RX ADMIN — ENOXAPARIN SODIUM 40 MILLIGRAM(S): 100 INJECTION SUBCUTANEOUS at 13:36

## 2018-03-08 NOTE — PROGRESS NOTE ADULT - SUBJECTIVE AND OBJECTIVE BOX
Dr. Sue (Nephrology)  Office [phone]  Cell [phone]  Tierney MITCHELL  Cell [phone]      Patient is a 83y old  Female who presents with a chief complaint of chest pain (08 Mar 2018 10:17)      Patient seen and examined at bedside. No chest pain/sob    VITALS:  T(F): 98 (03-08-18 @ 12:30), Max: 98.2 (03-07-18 @ 17:00)  HR: 66 (03-08-18 @ 12:30)  BP: 110/58 (03-08-18 @ 12:30)  RR: 18 (03-08-18 @ 12:30)  SpO2: 99% (03-08-18 @ 12:30)  Wt(kg): --    03-07 @ 07:01  -  03-08 @ 07:00  --------------------------------------------------------  IN: 520 mL / OUT: 1451 mL / NET: -931 mL    03-08 @ 07:01  -  03-08 @ 12:37  --------------------------------------------------------  IN: 120 mL / OUT: 0 mL / NET: 120 mL          PHYSICAL EXAM:  Constitutional: NAD  Neck: No JVD  Respiratory: b/l basilar crackles   Cardiovascular: S1, S2, RRR  Gastrointestinal: BS+, soft, NT/ND  Extremities: No peripheral edema    Hospital Medications:   MEDICATIONS  (STANDING):  atorvastatin 10 milliGRAM(s) Oral at bedtime  calcitriol   Capsule 0.25 MICROGram(s) Oral daily  dextrose 5%. 1000 milliLiter(s) (50 mL/Hr) IV Continuous <Continuous>  dextrose 50% Injectable 12.5 Gram(s) IV Push once  dextrose 50% Injectable 25 Gram(s) IV Push once  dextrose 50% Injectable 25 Gram(s) IV Push once  enoxaparin Injectable 40 milliGRAM(s) SubCutaneous daily  furosemide    Tablet 40 milliGRAM(s) Oral daily  insulin lispro (HumaLOG) corrective regimen sliding scale   SubCutaneous three times a day before meals  insulin lispro (HumaLOG) corrective regimen sliding scale   SubCutaneous at bedtime  lisinopril 5 milliGRAM(s) Oral daily  metoprolol     tartrate 50 milliGRAM(s) Oral two times a day  pantoprazole    Tablet 40 milliGRAM(s) Oral two times a day before meals  sucralfate 1 Gram(s) Oral four times a day      LABS:  03-08    137  |  99  |  25<H>  ----------------------------<  118<H>  4.1   |  26  |  1.21    Ca    8.9      08 Mar 2018 07:30  Mg     2.0     03-08      Creatinine Trend: 1.21 <--, 1.31 <--, 1.22 <--, 1.03 <--, 1.04 <--                                10.4   5.28  )-----------( 111      ( 08 Mar 2018 07:30 )             32.5     Urine Studies:  Urinalysis - [03-05-18 @ 14:37]      Color COLORL / Appearance CLEAR / SG 1.012 / pH 6.0      Gluc NEGATIVE / Ketone NEGATIVE  / Bili NEGATIVE / Urobili NORMAL       Blood NEGATIVE / Protein 20 / Leuk Est SMALL / Nitrite NEGATIVE      RBC 0-2 / WBC 5-10 / Hyaline 2-5 / Gran  / Sq Epi OCC / Non Sq Epi  / Bacteria     Urine Sodium 64      [03-05-18 @ 14:37]  Urine Osmolality 277      [03-05-18 @ 14:37]    Iron 24, TIBC 297, %sat --      [03-06-18 @ 05:23]  Ferritin 32.75      [03-06-18 @ 05:23]  PTH 99.49 (Ca --)      [03-06-18 @ 05:23]   --  Vitamin D (25OH) 34.3      [03-06-18 @ 05:23]  HbA1c 7.6      [03-05-18 @ 05:15]        RADIOLOGY & ADDITIONAL STUDIES:

## 2018-03-08 NOTE — PROGRESS NOTE ADULT - ASSESSMENT
ekg - nsr lbbb    a/p     1) Epigastric pain - mild tenderness, CT a/p doesn't explain pain, GI consult appreciated abd sono shows no pathology    2) Acute on chronic systolic CHF exacerbation - switch to PO lasix, spoke to grand son at length today, pt s/p AVR mitral and tricuspid annuloplasty rings in [facility] in 2016, at that time coronaries were ok no CABG performed, pt follows up with Dr Keven Estrada and is working her up for ICD as out patient , d/c plan    3) HLD - on insulin
ekg - nsr lbbb    a/p     1) Epigastric pain - mild tenderness, CT a/p doesn't explain pain, GI consult appreciated f/u abd sono    2) Acute on chronic systolic CHF exacerbation - cont IV lasix, as per daughter in chart EF 25%, tried to get in touch with daughter , but phone number doesn't work. will see if pt has had ischemia workup, f/u echo    3) HLD - on insulin
82 yo F PMH of CHF, CAD, HTN, DM present c/o chest heaviness and SOB
82 yo F admitted with acute on chronic systolic heart failure     Problem/Plan - 1:  ·  Problem: Acute on chronic systolic congestive heart failure.  Plan: lisinopril  DIURESIS AS PER CARDS  daily I & O   daily weight  Ischemia barnes as per cards    Problem/Plan - 2:  ·  Problem: Hyperlipidemia, unspecified hyperlipidemia type.  Plan: lipitor.     Problem/Plan - 3:  ·  Problem: Type 2 diabetes mellitus without complication, unspecified long term insulin use status.  Plan: FS qid  humalog SS.     Problem/Plan - 4:  ·  Problem: Essential hypertension.  Plan: cw current meds
82 yo F admitted with acute on chronic systolic heart failure     Problem/Plan - 1:  ·  Problem: Acute on chronic systolic congestive heart failure.  Plan: lisinopril  DIURESIS AS PER CARDS  daily I & O   daily weight  Ischemia barnes as per cards    Problem/Plan - 2:  ·  Problem: Hyperlipidemia, unspecified hyperlipidemia type.  Plan: lipitor.     Problem/Plan - 3:  ·  Problem: Type 2 diabetes mellitus without complication, unspecified long term insulin use status.  Plan: FS qid  humalog SS.     Problem/Plan - 4:  ·  Problem: Essential hypertension.  Plan: cw current meds
84 yo F PMH of CHF, CAD, HTN, DM present c/o chest heaviness and SOB
84 yo F PMH of CHF, CAD, HTN, DM present c/o chest heaviness and SOB
84 yo F admitted with acute on chronic systolic heart failure     Problem/Plan - 1:  ·  Problem: Acute on chronic systolic congestive heart failure.  Plan: lisinopril  DIURESIS AS PER CARDS  daily I & O   daily weight  tele  ECHO.   iSCHEMIA GREEN AS PER CARDS    Problem/Plan - 2:  ·  Problem: Hyperlipidemia, unspecified hyperlipidemia type.  Plan: lipitor.     Problem/Plan - 3:  ·  Problem: Type 2 diabetes mellitus without complication, unspecified long term insulin use status.  Plan: FS qid  CCU/DASH diet   humalog SS.     Problem/Plan - 4:  ·  Problem: Essential hypertension.  Plan: cw current meds
cardiac evaluation. conservaitve mangement based on comorbidities.  continue rx.
continue ppi, sonogram.  will get iron studies and guic for anemia.  would defer endosopic eval however, secondary to risk>> benefit
continue ppi.  current rx per cardiology

## 2018-03-08 NOTE — PROGRESS NOTE ADULT - SUBJECTIVE AND OBJECTIVE BOX
Patient is a 83y old  Female who presents with a chief complaint of chest pain (08 Mar 2018 10:17)      INTERVAL HPI/OVERNIGHT EVENTS:  T(C): 36.7 (03-08-18 @ 12:30), Max: 36.8 (03-07-18 @ 17:00)  HR: 66 (03-08-18 @ 12:30) (66 - 74)  BP: 110/58 (03-08-18 @ 12:30) (110/58 - 131/80)  RR: 18 (03-08-18 @ 12:30) (17 - 18)  SpO2: 99% (03-08-18 @ 12:30) (97% - 100%)  Wt(kg): --  I&O's Summary    07 Mar 2018 07:01  -  08 Mar 2018 07:00  --------------------------------------------------------  IN: 520 mL / OUT: 1451 mL / NET: -931 mL    08 Mar 2018 07:01  -  08 Mar 2018 16:33  --------------------------------------------------------  IN: 120 mL / OUT: 0 mL / NET: 120 mL        LABS:                        10.4   5.28  )-----------( 111      ( 08 Mar 2018 07:30 )             32.5     03-08    137  |  99  |  25<H>  ----------------------------<  118<H>  4.1   |  26  |  1.21    Ca    8.9      08 Mar 2018 07:30  Mg     2.0     03-08          CAPILLARY BLOOD GLUCOSE      POCT Blood Glucose.: 328 mg/dL (08 Mar 2018 12:55)  POCT Blood Glucose.: 139 mg/dL (08 Mar 2018 08:48)  POCT Blood Glucose.: 84 mg/dL (07 Mar 2018 22:01)  POCT Blood Glucose.: 238 mg/dL (07 Mar 2018 17:51)            MEDICATIONS  (STANDING):  atorvastatin 10 milliGRAM(s) Oral at bedtime  calcitriol   Capsule 0.25 MICROGram(s) Oral daily  dextrose 5%. 1000 milliLiter(s) (50 mL/Hr) IV Continuous <Continuous>  dextrose 50% Injectable 12.5 Gram(s) IV Push once  dextrose 50% Injectable 25 Gram(s) IV Push once  dextrose 50% Injectable 25 Gram(s) IV Push once  enoxaparin Injectable 40 milliGRAM(s) SubCutaneous daily  furosemide    Tablet 40 milliGRAM(s) Oral daily  insulin lispro (HumaLOG) corrective regimen sliding scale   SubCutaneous three times a day before meals  insulin lispro (HumaLOG) corrective regimen sliding scale   SubCutaneous at bedtime  lisinopril 5 milliGRAM(s) Oral daily  metoprolol     tartrate 50 milliGRAM(s) Oral two times a day  pantoprazole    Tablet 40 milliGRAM(s) Oral two times a day before meals  sucralfate 1 Gram(s) Oral four times a day    MEDICATIONS  (PRN):  aluminum hydroxide/magnesium hydroxide/simethicone Suspension 30 milliLiter(s) Oral every 6 hours PRN Dyspepsia  dextrose Gel 1 Dose(s) Oral once PRN Blood Glucose LESS THAN 70 milliGRAM(s)/deciliter  docusate sodium 100 milliGRAM(s) Oral two times a day PRN Constipation  glucagon  Injectable 1 milliGRAM(s) IntraMuscular once PRN Glucose LESS THAN 70 milligrams/deciliter          PHYSICAL EXAM:  GENERAL: NAD, well-groomed, well-developed  HEAD:  Atraumatic, Normocephalic  CHEST/LUNG: Clear to percussion bilaterally; No rales, rhonchi, wheezing, or rubs  HEART: Regular rate and rhythm; No murmurs, rubs, or gallops  ABDOMEN: Soft, Nontender, Nondistended; Bowel sounds present  EXTREMITIES:  2+ Peripheral Pulses, No clubbing, cyanosis, or edema  LYMPH: No lymphadenopathy noted  SKIN: No rashes or lesions    Care Discussed with Consultants/Other Providers [ ] YES  [ ] NO

## 2018-03-08 NOTE — DISCHARGE NOTE ADULT - MEDICATION SUMMARY - MEDICATIONS TO TAKE
I will START or STAY ON the medications listed below when I get home from the hospital:    lisinopril  -- 5 milligram(s) by mouth once a day  -- Indication: For HTN (hypertension)    Januvia 100 mg oral tablet  -- 1 tab(s) by mouth once a day  -- Indication: For DM (diabetes mellitus)    metFORMIN 500 mg oral tablet  -- orally 3 times a day  -- Indication: For DM (diabetes mellitus)    glimepiride 2 mg oral tablet  -- 1 tab(s) by mouth 2 times a day  -- Indication: For DM (diabetes mellitus)    Lipitor  -- 10 milligram(s) by mouth once a day (at bedtime)  -- Indication: For HLD (hyperlipidemia)    metoprolol tartrate 50 mg oral tablet  -- 1 tab(s) by mouth 2 times a day  -- Indication: For HTN (hypertension)    furosemide 40 mg oral tablet  -- 1 tab(s) by mouth once a day  -- Indication: For Heart failure    sucralfate 1 g oral tablet  -- 1 tab(s) by mouth 4 times a day  -- Indication: For gerd    omeprazole 40 mg oral delayed release capsule  -- 1 cap(s) by mouth once a day  -- Indication: For gerf    Oyster Shell Calcium with Vitamin D  -- 1 tab(s) by mouth 2 times a day  -- Indication: For Supplement    calcitriol 0.25 mcg oral capsule  -- 1 cap(s) by mouth once a day  -- Indication: For Supplement

## 2018-03-08 NOTE — PROGRESS NOTE ADULT - PROVIDER SPECIALTY LIST ADULT
Gastroenterology
Hospitalist
Nephrology
Cardiology
Cardiology

## 2018-03-08 NOTE — DISCHARGE NOTE ADULT - CARE PLAN
Principal Discharge DX:	Acute on chronic systolic congestive heart failure  Goal:	Prevent shortness of breath  Assessment and plan of treatment:	continue lasix as directed. follow up with Dr Estrada for continue management and ICD evaluation. Your EF on this admission is 17  Secondary Diagnosis:	Anemia  Goal:	To treat the underlying cause and restore a normal red blood cells level, to improve  the amount of oxygen that your blood can carry by increasing your hemoglobin and hematocrit level, and to prevent signs and symptoms such as shortness of breath, dizziness, weakness, congestive heart failure and death.  Assessment and plan of treatment:	Continue to take current medications as prescribed.  Follow up your routine physician's appointments.  If you notice any bleeding, please notify your doctor immediately or go to the nearest emergency room.  Secondary Diagnosis:	Iliac artery dissection  Goal:	prevent worsen dissection  Assessment and plan of treatment:	Follow up with your PMD for vascular referral. 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  Secondary Diagnosis:	Essential hypertension  Goal:	To maintain a normal blood pressure to prevent heart attack, stroke and renal failure.  Assessment and plan of treatment:	Low sodium and fat diet, continue anti-hypertensive medications, and follow up with primary care physician.  Secondary Diagnosis:	DM (diabetes mellitus)  Goal:	To maintain a normal blood glucose level and HgA1C level < 5.7 and to prevent diabetic complications such as uncontrolled diabetes, diabetic coma, blindness, renal failure, and amputations.  Assessment and plan of treatment:	Monitor finger sticks pre-meal and bedtime, low salt, fat and carbohydrate diet, minimize glucose intake.  Exercise daily for at least 30 minutes and weight loss.  Follow up with primary care physician and endocrinologist for routine Hemoglobin A1C checks and management.  Follow up with your ophthalmologist for routine yearly vision exams.

## 2018-03-08 NOTE — PROGRESS NOTE ADULT - SUBJECTIVE AND OBJECTIVE BOX
TANO BATISTA:[id number],   83yFemale followed for:  No Known Allergies    PAST MEDICAL & SURGICAL HISTORY:  Heart failure, systolic, due to CAD  DM (diabetes mellitus)  HLD (hyperlipidemia)  HTN (hypertension)  Aortic valve replaced    FAMILY HISTORY:    MEDICATIONS  (STANDING):  atorvastatin 10 milliGRAM(s) Oral at bedtime  calcitriol   Capsule 0.25 MICROGram(s) Oral daily  dextrose 5%. 1000 milliLiter(s) (50 mL/Hr) IV Continuous <Continuous>  dextrose 50% Injectable 12.5 Gram(s) IV Push once  dextrose 50% Injectable 25 Gram(s) IV Push once  dextrose 50% Injectable 25 Gram(s) IV Push once  enoxaparin Injectable 40 milliGRAM(s) SubCutaneous daily  furosemide    Tablet 40 milliGRAM(s) Oral daily  insulin lispro (HumaLOG) corrective regimen sliding scale   SubCutaneous three times a day before meals  insulin lispro (HumaLOG) corrective regimen sliding scale   SubCutaneous at bedtime  lisinopril 5 milliGRAM(s) Oral daily  metoprolol     tartrate 50 milliGRAM(s) Oral two times a day  pantoprazole    Tablet 40 milliGRAM(s) Oral two times a day before meals  sucralfate 1 Gram(s) Oral four times a day    MEDICATIONS  (PRN):  aluminum hydroxide/magnesium hydroxide/simethicone Suspension 30 milliLiter(s) Oral every 6 hours PRN Dyspepsia  dextrose Gel 1 Dose(s) Oral once PRN Blood Glucose LESS THAN 70 milliGRAM(s)/deciliter  docusate sodium 100 milliGRAM(s) Oral two times a day PRN Constipation  glucagon  Injectable 1 milliGRAM(s) IntraMuscular once PRN Glucose LESS THAN 70 milligrams/deciliter      Vital Signs Last 24 Hrs  T(C): 36.4 (08 Mar 2018 05:43), Max: 36.8 (07 Mar 2018 17:00)  T(F): 97.5 (08 Mar 2018 05:43), Max: 98.2 (07 Mar 2018 17:00)  HR: 74 (08 Mar 2018 05:43) (66 - 74)  BP: 131/80 (08 Mar 2018 05:43) (118/67 - 131/80)  BP(mean): --  RR: 17 (08 Mar 2018 05:43) (17 - 18)  SpO2: 97% (08 Mar 2018 05:43) (97% - 100%)  nc/at  s1s2  cta  soft, nt, nd no guarding or rebound  no c/c/e    CBC Full  -  ( 08 Mar 2018 07:30 )  WBC Count : 5.28 K/uL  Hemoglobin : 10.4 g/dL  Hematocrit : 32.5 %  Platelet Count - Automated : 111 K/uL  Mean Cell Volume : 82.1 fL  Mean Cell Hemoglobin : 26.3 pg  Mean Cell Hemoglobin Concentration : 32.0 %  Auto Neutrophil # : x  Auto Lymphocyte # : x  Auto Monocyte # : x  Auto Eosinophil # : x  Auto Basophil # : x  Auto Neutrophil % : x  Auto Lymphocyte % : x  Auto Monocyte % : x  Auto Eosinophil % : x  Auto Basophil % : x    03-08    137  |  99  |  25<H>  ----------------------------<  118<H>  4.1   |  26  |  1.21    Ca    8.9      08 Mar 2018 07:30  Mg     2.0     03-08

## 2018-03-08 NOTE — DISCHARGE NOTE ADULT - PATIENT PORTAL LINK FT
You can access the [facility], offered by [facility], by registering with the following website: [email or web address]

## 2018-03-08 NOTE — DISCHARGE NOTE ADULT - HOME CARE AGENCY
PT and RN visit to be determined. Awaiting insurance auth from [facility]. [phone]

## 2018-03-08 NOTE — DISCHARGE NOTE ADULT - MEDICATION SUMMARY - MEDICATIONS TO CHANGE
I will SWITCH the dose or number of times a day I take the medications listed below when I get home from the hospital:    Lasix  -- 20 milligram(s) by mouth 2 times a day

## 2018-03-08 NOTE — DISCHARGE NOTE ADULT - PLAN OF CARE
Prevent shortness of breath continue lasix as directed. follow up with Dr Estrada for continue management and ICD evaluation. Your EF on this admission is 17 To treat the underlying cause and restore a normal red blood cells level, to improve  the amount of oxygen that your blood can carry by increasing your hemoglobin and hematocrit level, and to prevent signs and symptoms such as shortness of breath, dizziness, weakness, congestive heart failure and death. Continue to take current medications as prescribed.  Follow up your routine physician's appointments.  If you notice any bleeding, please notify your doctor immediately or go to the nearest emergency room. prevent worsen dissection Follow up with your PMD for vascular referral. 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 To maintain a normal blood pressure to prevent heart attack, stroke and renal failure. Low sodium and fat diet, continue anti-hypertensive medications, and follow up with primary care physician. To maintain a normal blood glucose level and HgA1C level < 5.7 and to prevent diabetic complications such as uncontrolled diabetes, diabetic coma, blindness, renal failure, and amputations. Monitor finger sticks pre-meal and bedtime, low salt, fat and carbohydrate diet, minimize glucose intake.  Exercise daily for at least 30 minutes and weight loss.  Follow up with primary care physician and endocrinologist for routine Hemoglobin A1C checks and management.  Follow up with your ophthalmologist for routine yearly vision exams.

## 2018-04-10 ENCOUNTER — INPATIENT (INPATIENT)
Facility: HOSPITAL | Age: 83
LOS: 4 days | Discharge: HOME | End: 2018-04-15
Attending: THORACIC SURGERY (CARDIOTHORACIC VASCULAR SURGERY) | Admitting: THORACIC SURGERY (CARDIOTHORACIC VASCULAR SURGERY)
Payer: MEDICAID

## 2018-04-10 VITALS
DIASTOLIC BLOOD PRESSURE: 69 MMHG | HEART RATE: 79 BPM | OXYGEN SATURATION: 100 % | TEMPERATURE: 98 F | RESPIRATION RATE: 16 BRPM | SYSTOLIC BLOOD PRESSURE: 132 MMHG

## 2018-04-10 DIAGNOSIS — Z95.2 PRESENCE OF PROSTHETIC HEART VALVE: Chronic | ICD-10-CM

## 2018-04-10 LAB
ALBUMIN SERPL ELPH-MCNC: 3.3 G/DL — SIGNIFICANT CHANGE UP (ref 3.3–5)
ALP SERPL-CCNC: 84 U/L — SIGNIFICANT CHANGE UP (ref 40–120)
ALT FLD-CCNC: 13 U/L — SIGNIFICANT CHANGE UP (ref 4–33)
AST SERPL-CCNC: 26 U/L — SIGNIFICANT CHANGE UP (ref 4–32)
BASE EXCESS BLDV CALC-SCNC: 1.1 MMOL/L — SIGNIFICANT CHANGE UP
BASOPHILS # BLD AUTO: 0.01 K/UL — SIGNIFICANT CHANGE UP (ref 0–0.2)
BASOPHILS NFR BLD AUTO: 0.2 % — SIGNIFICANT CHANGE UP (ref 0–2)
BILIRUB SERPL-MCNC: 0.6 MG/DL — SIGNIFICANT CHANGE UP (ref 0.2–1.2)
BLOOD GAS VENOUS - CREATININE: 1.04 MG/DL — SIGNIFICANT CHANGE UP (ref 0.5–1.3)
BUN SERPL-MCNC: 24 MG/DL — HIGH (ref 7–23)
CALCIUM SERPL-MCNC: 8.4 MG/DL — SIGNIFICANT CHANGE UP (ref 8.4–10.5)
CHLORIDE BLDV-SCNC: 104 MMOL/L — SIGNIFICANT CHANGE UP (ref 96–108)
CHLORIDE SERPL-SCNC: 99 MMOL/L — SIGNIFICANT CHANGE UP (ref 98–107)
CK MB BLD-MCNC: 2.3 NG/ML — SIGNIFICANT CHANGE UP (ref 1–4.7)
CK MB BLD-MCNC: SIGNIFICANT CHANGE UP (ref 0–2.5)
CK SERPL-CCNC: 47 U/L — SIGNIFICANT CHANGE UP (ref 25–170)
CO2 SERPL-SCNC: 23 MMOL/L — SIGNIFICANT CHANGE UP (ref 22–31)
CREAT SERPL-MCNC: 1.05 MG/DL — SIGNIFICANT CHANGE UP (ref 0.5–1.3)
EOSINOPHIL # BLD AUTO: 0.11 K/UL — SIGNIFICANT CHANGE UP (ref 0–0.5)
EOSINOPHIL NFR BLD AUTO: 2.5 % — SIGNIFICANT CHANGE UP (ref 0–6)
GAS PNL BLDV: 131 MMOL/L — LOW (ref 136–146)
GLUCOSE BLDV-MCNC: 216 — HIGH (ref 70–99)
GLUCOSE SERPL-MCNC: 217 MG/DL — HIGH (ref 70–99)
HCO3 BLDV-SCNC: 24 MMOL/L — SIGNIFICANT CHANGE UP (ref 20–27)
HCT VFR BLD CALC: 31.1 % — LOW (ref 34.5–45)
HCT VFR BLDV CALC: 30.9 % — LOW (ref 34.5–45)
HGB BLD-MCNC: 9.6 G/DL — LOW (ref 11.5–15.5)
HGB BLDV-MCNC: 10 G/DL — LOW (ref 11.5–15.5)
IMM GRANULOCYTES # BLD AUTO: 0.01 # — SIGNIFICANT CHANGE UP
IMM GRANULOCYTES NFR BLD AUTO: 0.2 % — SIGNIFICANT CHANGE UP (ref 0–1.5)
LACTATE BLDV-MCNC: 1.9 MMOL/L — SIGNIFICANT CHANGE UP (ref 0.5–2)
LYMPHOCYTES # BLD AUTO: 1.21 K/UL — SIGNIFICANT CHANGE UP (ref 1–3.3)
LYMPHOCYTES # BLD AUTO: 27.2 % — SIGNIFICANT CHANGE UP (ref 13–44)
MCHC RBC-ENTMCNC: 24.9 PG — LOW (ref 27–34)
MCHC RBC-ENTMCNC: 30.9 % — LOW (ref 32–36)
MCV RBC AUTO: 80.6 FL — SIGNIFICANT CHANGE UP (ref 80–100)
MONOCYTES # BLD AUTO: 0.6 K/UL — SIGNIFICANT CHANGE UP (ref 0–0.9)
MONOCYTES NFR BLD AUTO: 13.5 % — SIGNIFICANT CHANGE UP (ref 2–14)
NEUTROPHILS # BLD AUTO: 2.51 K/UL — SIGNIFICANT CHANGE UP (ref 1.8–7.4)
NEUTROPHILS NFR BLD AUTO: 56.4 % — SIGNIFICANT CHANGE UP (ref 43–77)
NRBC # FLD: 0 — SIGNIFICANT CHANGE UP
PCO2 BLDV: 49 MMHG — SIGNIFICANT CHANGE UP (ref 41–51)
PH BLDV: 7.34 PH — SIGNIFICANT CHANGE UP (ref 7.32–7.43)
PLATELET # BLD AUTO: 95 K/UL — LOW (ref 150–400)
PMV BLD: 11.7 FL — SIGNIFICANT CHANGE UP (ref 7–13)
PO2 BLDV: 27 MMHG — LOW (ref 35–40)
POTASSIUM BLDV-SCNC: 3.8 MMOL/L — SIGNIFICANT CHANGE UP (ref 3.4–4.5)
POTASSIUM SERPL-MCNC: 4 MMOL/L — SIGNIFICANT CHANGE UP (ref 3.5–5.3)
POTASSIUM SERPL-SCNC: 4 MMOL/L — SIGNIFICANT CHANGE UP (ref 3.5–5.3)
PROT SERPL-MCNC: 8 G/DL — SIGNIFICANT CHANGE UP (ref 6–8.3)
RBC # BLD: 3.86 M/UL — SIGNIFICANT CHANGE UP (ref 3.8–5.2)
RBC # FLD: 18.3 % — HIGH (ref 10.3–14.5)
SAO2 % BLDV: 37 % — LOW (ref 60–85)
SODIUM SERPL-SCNC: 134 MMOL/L — LOW (ref 135–145)
TROPONIN T SERPL-MCNC: < 0.06 NG/ML — SIGNIFICANT CHANGE UP (ref 0–0.06)
WBC # BLD: 4.45 K/UL — SIGNIFICANT CHANGE UP (ref 3.8–10.5)
WBC # FLD AUTO: 4.45 K/UL — SIGNIFICANT CHANGE UP (ref 3.8–10.5)

## 2018-04-10 NOTE — ED PROVIDER NOTE - OBJECTIVE STATEMENT
82 y/o F w/ hx of CAD, CHF (EF 17%), HTN, HLD, DM2 p/w 2-3 days of abd pain and SOB. Pt endorses abd pain that prevents her from sleeping along with gas, associated with eating. Pt also developing worsening SOB, BOYKIN, orthopnea and LE edema over the last few days. + nonproductive cough. No CP, diarrhea, N/V, dysuria, fevers/chills. Pt does endorse some constipation. Of note, pt was recently admitted for CHF exacerbation where EF was found to be 17%. Pt was taking her meds at home.

## 2018-04-10 NOTE — ED PROVIDER NOTE - PHYSICAL EXAMINATION
General: mild distress after moving around in bed from physical exam  Neurology: A&Ox3, nonfocal  Head:  Normocephalic, atraumatic  ENT:  Mucosa moist, no ulcerations  Neck:  Supple, no sinuses or palpable masses  Lymphatic:  No palpable cervical, supraclavicular adenopathy  Respiratory: +bibasilar crackles  CV: RRR, S1S2, +JVD  Abdominal: Soft, +diffusely tender, ND no palpable mass  MSK: +mild LE edema bilaterally, + peripheral pulses, FROM all 4 extremity General: nad  Neurology: A&Ox3, nonfocal  Head:  Normocephalic, atraumatic  ENT:  Mucosa moist, no ulcerations  Neck:  Supple, no sinuses or palpable masses  Lymphatic:  No palpable cervical, supraclavicular adenopathy  Respiratory: +bibasilar crackles  CV: RRR, S1S2, +JVD  Abdominal: Soft, +diffusely tender, ND no palpable mass  MSK: +mild LE edema bilaterally, + peripheral pulses, FROM all 4 extremity

## 2018-04-10 NOTE — ED PROVIDER NOTE - CARE PLAN
Principal Discharge DX:	Pulmonary edema Principal Discharge DX:	Pulmonary edema  Secondary Diagnosis:	Acute on chronic congestive heart failure, unspecified heart failure type

## 2018-04-10 NOTE — ED PROVIDER NOTE - SHIFT CHANGE DETAILS
s/o to f/u ct results, labs, and if ct with no acute pathology, admit for likely chf exac with diuresis

## 2018-04-10 NOTE — ED PROVIDER NOTE - PROGRESS NOTE DETAILS
patient having some burning pain radiating from abdomen to chest, seems consistent with reflux, giving maalox. also informed patient and family that will likely be admitted given that she has fluid in her lungs on CXR and receiving lasix. Klepfish: XR/ct c/w pulm edema and effusions, no other acute pathology. given lasix. admitting for further cardiac w/u. recently admitted to dr gilbert. d/w dr. gilbert and text paged tele pa.

## 2018-04-10 NOTE — ED PROVIDER NOTE - ATTENDING CONTRIBUTION TO CARE
84 y/o f presents with worsening b/l le edema, abdominal pain and sob. SYmptoms started a few days ago, pain in abdomen diffuse, constant, dull/aching, no relieving/exac factors. SOB intiially on exertion but now feels at rest as well. States she has had these symptoms in past and was hospitalized for them. No associated fevers, chills, cough, chest pain, vomiting, diarrhea, dysuria.   exam  GEN - NAD; well appearing; A+O x3   HEAD - NC/AT   EYES- PERRL, EOMI  ENT: Airway patent, mmm, Oral cavity and pharynx normal. No inflammation, swelling, exudate, or lesions.  NECK: Neck supple, non-tender without lymphadenopathy, no masses.  PULMONARY - basilar crackles, no accessory muscle use, unlabored, symmetric breath sounds.   CARDIAC -s1s2, RRR, no M,G,R  ABDOMEN - +BS, diffusely ttp, no rebound, no masses  BACK - no CVA tenderness, Normal  spine   EXTREMITIES - FROM, symmetric pulses, capillary refill < 2 seconds, trace b/l le edema  SKIN - no rash or bruising   NEUROLOGIC - alert, speech clear, no focal deficits  PSYCH -nl mood/affect, nl insight.  a/p-abd pain, sob/betts, b/l le edema, with similar symptoms in past which on chart review were noted to be in setting of chf exacerbation. Patient appears nontoxic, in no distress, vs noted, given tender abd exam, will ct a/p to eval for acute intraabdominal pathology, if ok, likely 2/2 chf, will check labs, ekg, cxr, monitor. 82 y/o f presents with worsening b/l le edema, abdominal pain and sob. SYmptoms started a few days ago, pain in abdomen diffuse, constant, dull/aching, no relieving/exac factors. SOB intiially on exertion but now feels at rest as well. States she has had these symptoms in past and was hospitalized for them. No associated fevers, chills, cough, chest pain, vomiting, diarrhea, dysuria.   exam  GEN - NAD; well appearing; A+O x3   HEAD - NC/AT   EYES- PERRL, EOMI  ENT: Airway patent, mmm, Oral cavity and pharynx normal. No inflammation, swelling, exudate, or lesions.  NECK: Neck supple, non-tender without lymphadenopathy, no masses, +jvd  PULMONARY - basilar crackles, no accessory muscle use, unlabored, symmetric breath sounds.   CARDIAC -s1s2, RRR, no M,G,R  ABDOMEN - +BS, diffusely ttp, no rebound, no masses  BACK - no CVA tenderness, Normal  spine   EXTREMITIES - FROM, symmetric pulses, capillary refill < 2 seconds, trace b/l le edema  SKIN - no rash or bruising   NEUROLOGIC - alert, speech clear, no focal deficits  PSYCH -nl mood/affect, nl insight.  a/p-abd pain, sob/betts, b/l le edema, with similar symptoms in past which on chart review were noted to be in setting of chf exacerbation. Patient appears nontoxic, in no distress, vs noted, given tender abd exam, will ct a/p to eval for acute intraabdominal pathology, if ok, likely 2/2 chf, will check labs, ekg, cxr, monitor.

## 2018-04-10 NOTE — ED ADULT TRIAGE NOTE - CHIEF COMPLAINT QUOTE
Pt brought in by tee, who states pt has been short of breath with abdominal pain since last night.  son also reports that pt has B/L foot swelling.  No swelling noted in triage.

## 2018-04-10 NOTE — ED ADULT NURSE NOTE - OBJECTIVE STATEMENT
Pt. received into room # 29 with c/o midepigastric pain radiating to back 9/10 since last night.  As per grandson Pt. has also been sob.  Hx. Bypass and valve replacement.

## 2018-04-11 DIAGNOSIS — I25.10 ATHEROSCLEROTIC HEART DISEASE OF NATIVE CORONARY ARTERY WITHOUT ANGINA PECTORIS: ICD-10-CM

## 2018-04-11 DIAGNOSIS — Z29.9 ENCOUNTER FOR PROPHYLACTIC MEASURES, UNSPECIFIED: ICD-10-CM

## 2018-04-11 DIAGNOSIS — J81.1 CHRONIC PULMONARY EDEMA: ICD-10-CM

## 2018-04-11 DIAGNOSIS — I10 ESSENTIAL (PRIMARY) HYPERTENSION: ICD-10-CM

## 2018-04-11 DIAGNOSIS — I50.23 ACUTE ON CHRONIC SYSTOLIC (CONGESTIVE) HEART FAILURE: ICD-10-CM

## 2018-04-11 DIAGNOSIS — E11.9 TYPE 2 DIABETES MELLITUS WITHOUT COMPLICATIONS: ICD-10-CM

## 2018-04-11 DIAGNOSIS — E78.5 HYPERLIPIDEMIA, UNSPECIFIED: ICD-10-CM

## 2018-04-11 DIAGNOSIS — R10.13 EPIGASTRIC PAIN: ICD-10-CM

## 2018-04-11 LAB
ANISOCYTOSIS BLD QL: SLIGHT — SIGNIFICANT CHANGE UP
APPEARANCE UR: SIGNIFICANT CHANGE UP
BACTERIA # UR AUTO: SIGNIFICANT CHANGE UP
BASOPHILS NFR SPEC: 0 % — SIGNIFICANT CHANGE UP (ref 0–2)
BILIRUB UR-MCNC: NEGATIVE — SIGNIFICANT CHANGE UP
BLOOD UR QL VISUAL: HIGH
CK MB BLD-MCNC: 2.26 NG/ML — SIGNIFICANT CHANGE UP (ref 1–4.7)
CK SERPL-CCNC: 45 U/L — SIGNIFICANT CHANGE UP (ref 25–170)
COLOR SPEC: YELLOW — SIGNIFICANT CHANGE UP
EOSINOPHIL NFR FLD: 2 % — SIGNIFICANT CHANGE UP (ref 0–6)
GLUCOSE UR-MCNC: NEGATIVE — SIGNIFICANT CHANGE UP
HBA1C BLD-MCNC: 7.6 % — HIGH (ref 4–5.6)
HCT VFR BLD CALC: 31.5 % — LOW (ref 34.5–45)
HGB BLD-MCNC: 9.7 G/DL — LOW (ref 11.5–15.5)
HYALINE CASTS # UR AUTO: SIGNIFICANT CHANGE UP (ref 0–?)
HYPOCHROMIA BLD QL: SLIGHT — SIGNIFICANT CHANGE UP
KETONES UR-MCNC: NEGATIVE — SIGNIFICANT CHANGE UP
LEUKOCYTE ESTERASE UR-ACNC: HIGH
LYMPHOCYTES NFR SPEC AUTO: 14 % — SIGNIFICANT CHANGE UP (ref 13–44)
MANUAL SMEAR VERIFICATION: SIGNIFICANT CHANGE UP
MCHC RBC-ENTMCNC: 24.7 PG — LOW (ref 27–34)
MCHC RBC-ENTMCNC: 30.8 % — LOW (ref 32–36)
MCV RBC AUTO: 80.4 FL — SIGNIFICANT CHANGE UP (ref 80–100)
MICROCYTES BLD QL: SLIGHT — SIGNIFICANT CHANGE UP
MONOCYTES NFR BLD: 13 % — HIGH (ref 2–9)
MUCOUS THREADS # UR AUTO: SIGNIFICANT CHANGE UP
NEUTROPHIL AB SER-ACNC: 70 % — SIGNIFICANT CHANGE UP (ref 43–77)
NITRITE UR-MCNC: NEGATIVE — SIGNIFICANT CHANGE UP
NON-SQ EPI CELLS # UR AUTO: 1 — SIGNIFICANT CHANGE UP
NRBC # BLD: 0 /100WBC — SIGNIFICANT CHANGE UP
NRBC # FLD: 0 — SIGNIFICANT CHANGE UP
NT-PROBNP SERPL-SCNC: SIGNIFICANT CHANGE UP PG/ML
PH UR: 6 — SIGNIFICANT CHANGE UP (ref 4.6–8)
PLATELET # BLD AUTO: 103 K/UL — LOW (ref 150–400)
PLATELET COUNT - ESTIMATE: SIGNIFICANT CHANGE UP
PMV BLD: 10.8 FL — SIGNIFICANT CHANGE UP (ref 7–13)
POLYCHROMASIA BLD QL SMEAR: SLIGHT — SIGNIFICANT CHANGE UP
PROT UR-MCNC: 100 MG/DL — HIGH
RBC # BLD: 3.92 M/UL — SIGNIFICANT CHANGE UP (ref 3.8–5.2)
RBC # FLD: 18.3 % — HIGH (ref 10.3–14.5)
RBC CASTS # UR COMP ASSIST: SIGNIFICANT CHANGE UP (ref 0–?)
SP GR SPEC: 1.01 — SIGNIFICANT CHANGE UP (ref 1–1.04)
SQUAMOUS # UR AUTO: SIGNIFICANT CHANGE UP
TROPONIN T SERPL-MCNC: < 0.06 NG/ML — SIGNIFICANT CHANGE UP (ref 0–0.06)
UROBILINOGEN FLD QL: NORMAL MG/DL — SIGNIFICANT CHANGE UP
VARIANT LYMPHS # BLD: 1 % — SIGNIFICANT CHANGE UP
WBC # BLD: 4.47 K/UL — SIGNIFICANT CHANGE UP (ref 3.8–10.5)
WBC # FLD AUTO: 4.47 K/UL — SIGNIFICANT CHANGE UP (ref 3.8–10.5)
WBC UR QL: SIGNIFICANT CHANGE UP (ref 0–?)

## 2018-04-11 PROCEDURE — 74177 CT ABD & PELVIS W/CONTRAST: CPT | Mod: 26

## 2018-04-11 PROCEDURE — 71045 X-RAY EXAM CHEST 1 VIEW: CPT | Mod: 26

## 2018-04-11 RX ORDER — FUROSEMIDE 40 MG
40 TABLET ORAL EVERY 12 HOURS
Qty: 0 | Refills: 0 | Status: DISCONTINUED | OUTPATIENT
Start: 2018-04-11 | End: 2018-04-15

## 2018-04-11 RX ORDER — GLUCAGON INJECTION, SOLUTION 0.5 MG/.1ML
1 INJECTION, SOLUTION SUBCUTANEOUS ONCE
Qty: 0 | Refills: 0 | Status: DISCONTINUED | OUTPATIENT
Start: 2018-04-11 | End: 2018-04-15

## 2018-04-11 RX ORDER — PANTOPRAZOLE SODIUM 20 MG/1
40 TABLET, DELAYED RELEASE ORAL
Qty: 0 | Refills: 0 | Status: DISCONTINUED | OUTPATIENT
Start: 2018-04-11 | End: 2018-04-15

## 2018-04-11 RX ORDER — DEXTROSE 50 % IN WATER 50 %
25 SYRINGE (ML) INTRAVENOUS ONCE
Qty: 0 | Refills: 0 | Status: DISCONTINUED | OUTPATIENT
Start: 2018-04-11 | End: 2018-04-15

## 2018-04-11 RX ORDER — ASPIRIN/CALCIUM CARB/MAGNESIUM 324 MG
1 TABLET ORAL
Qty: 0 | Refills: 0 | COMMUNITY

## 2018-04-11 RX ORDER — DEXTROSE 50 % IN WATER 50 %
1 SYRINGE (ML) INTRAVENOUS ONCE
Qty: 0 | Refills: 0 | Status: DISCONTINUED | OUTPATIENT
Start: 2018-04-11 | End: 2018-04-15

## 2018-04-11 RX ORDER — INSULIN LISPRO 100/ML
VIAL (ML) SUBCUTANEOUS
Qty: 0 | Refills: 0 | Status: DISCONTINUED | OUTPATIENT
Start: 2018-04-11 | End: 2018-04-15

## 2018-04-11 RX ORDER — FUROSEMIDE 40 MG
40 TABLET ORAL ONCE
Qty: 0 | Refills: 0 | Status: COMPLETED | OUTPATIENT
Start: 2018-04-11 | End: 2018-04-11

## 2018-04-11 RX ORDER — ENOXAPARIN SODIUM 100 MG/ML
40 INJECTION SUBCUTANEOUS EVERY 24 HOURS
Qty: 0 | Refills: 0 | Status: DISCONTINUED | OUTPATIENT
Start: 2018-04-11 | End: 2018-04-13

## 2018-04-11 RX ORDER — FAMOTIDINE 10 MG/ML
20 INJECTION INTRAVENOUS EVERY 12 HOURS
Qty: 0 | Refills: 0 | Status: DISCONTINUED | OUTPATIENT
Start: 2018-04-11 | End: 2018-04-15

## 2018-04-11 RX ORDER — DEXTROSE 50 % IN WATER 50 %
12.5 SYRINGE (ML) INTRAVENOUS ONCE
Qty: 0 | Refills: 0 | Status: DISCONTINUED | OUTPATIENT
Start: 2018-04-11 | End: 2018-04-15

## 2018-04-11 RX ORDER — RANITIDINE HYDROCHLORIDE 150 MG/1
1 TABLET, FILM COATED ORAL
Qty: 0 | Refills: 0 | COMMUNITY

## 2018-04-11 RX ORDER — INSULIN LISPRO 100/ML
VIAL (ML) SUBCUTANEOUS AT BEDTIME
Qty: 0 | Refills: 0 | Status: DISCONTINUED | OUTPATIENT
Start: 2018-04-11 | End: 2018-04-15

## 2018-04-11 RX ADMIN — FAMOTIDINE 20 MILLIGRAM(S): 10 INJECTION INTRAVENOUS at 09:34

## 2018-04-11 RX ADMIN — Medication 2: at 14:18

## 2018-04-11 RX ADMIN — ENOXAPARIN SODIUM 40 MILLIGRAM(S): 100 INJECTION SUBCUTANEOUS at 18:46

## 2018-04-11 RX ADMIN — Medication 40 MILLIGRAM(S): at 02:30

## 2018-04-11 RX ADMIN — Medication 1: at 18:44

## 2018-04-11 RX ADMIN — Medication 40 MILLIGRAM(S): at 09:34

## 2018-04-11 RX ADMIN — PANTOPRAZOLE SODIUM 40 MILLIGRAM(S): 20 TABLET, DELAYED RELEASE ORAL at 09:34

## 2018-04-11 RX ADMIN — FAMOTIDINE 20 MILLIGRAM(S): 10 INJECTION INTRAVENOUS at 18:46

## 2018-04-11 RX ADMIN — Medication 30 MILLILITER(S): at 03:16

## 2018-04-11 RX ADMIN — Medication 40 MILLIGRAM(S): at 18:46

## 2018-04-11 NOTE — H&P ADULT - NEUROLOGICAL DETAILS
alert and oriented x 3/cranial nerves intact/normal strength/sensation intact/no spontaneous movement

## 2018-04-11 NOTE — H&P ADULT - PROBLEM SELECTOR PLAN 1
Admit to telemetry; serial ECGs, cardiac enzymes x 2, CBC, CHEM, FLP, HIAC, BNP;   Lasix 40mg BID tele monitor  cardiac enzymes x 2 to R/O ACS.  serial EKGs  2G Sodium 1800 ADA diet with 1500 cc Fluid restriction  Strict I&Os plus Daily weights.  Lasix 40mg IV Q12

## 2018-04-11 NOTE — H&P ADULT - NSHPSOCIALHISTORY_GEN_ALL_CORE
1. Patient is   2.Lives in private residence with children  3. Denies tobacco, alcohol, or illicit drug use

## 2018-04-11 NOTE — CONSULT NOTE ADULT - ASSESSMENT
83 year old Romario speaking female with PMH of NICM, chronic HFrEF, LVEF 40-45%, NYHA class II-III, non-obstructive CAD, HTN, HLD, hypothyroidism, NIDDM s/p bioprosthetic AVR 2016 now presented with acute decompensated HF.  A recent echo done on March 6 showed LVEF 17%.  Considering her severe systolic LV dysfunction despite optimal medical therapy for many years and Class II-III HF, patient meets criteria for ICD for primary prevention of SCD sudden cardiac death.    -Continue HF treatment per primary team  -Recommend ICD for primary prevention of SCD when euvolemic  -Will follow up  -Keep K >4.0 and Mag> 1.8

## 2018-04-11 NOTE — H&P ADULT - NEGATIVE NEUROLOGICAL SYMPTOMS
no paresthesias/no syncope/no loss of consciousness/no generalized seizures/no loss of sensation/no weakness/no confusion/no transient paralysis

## 2018-04-11 NOTE — H&P ADULT - ASSESSMENT
84 yo female with PMHx of DM, HLD, HTN, HF (systolic, EF 17%, TTE 3/6/18), s/p AVR (2016) presents to ED with Tee () complaining of intermittent epigastric pain with burning-like quality, that radiates to the substernal chest, 9/10 severity that began about 1 month ago.  As per tee, pt also has associated SOB, worsening BOYKIN, productive clear cough, and LLE pitting edema that all became very noticeable 2 days ago.  Patient will be admitted to telemetry for acute on chronic systolic heart failure. 84 yo female with PMHx of DM, HLD, HTN, HF (systolic, EF 17%, TTE 3/6/18), s/p AVR (2016) p/w epigastric pain x 1 month, and worsening BOYKIN, now dyspnea w/ minimal exertion, orthopnea, PND and worsening LE edema, found to have elevated proBNP and Acute Pulm Edema on CXR admitted to tele for Atypical Chest pain, r/o ACS and Acute on Chronic systolic HF, r/o ACS.

## 2018-04-11 NOTE — H&P ADULT - HISTORY OF PRESENT ILLNESS
82 yo female with PMHx of DM, HLD, HTN, HF (systolic, EF 17%, TTE 3/6/18), s/p AVR (2016) presents to ED with Abbie () complaining of intermittent epigastric pain with burning-like quality, that radiates to the substernal chest, 9/10 severity that began about 1 month ago.  As per grandcelio, pt also has associated SOB, worsening BOYKIN, productive clear cough, and LLE pitting edema that all became very noticeable 2 days ago.  As per grandcelio, the patient also lost her appetite and was not sleeping 2/2 the symptoms.  He states, the pain and SOB seemed to be worse at night, starting at around 11pm and continuing till 6am, at which time she would finally be able to fall asleep for a short time.  Aggravating factors are laying down flat and walking, both causing increased HR and shortness of breath; an aberration from her normal baseline as she was priorly able to get around the house without this level of difficulty.  Abbie reports that the symptoms were mildly alleviated if she sat up straight and avoided laying down flat. Abbie states the patient is compliant with her medications.  He also reports she follows a healthy diet, but sometimes "asks for extra salt" with her meals.   Patient denies CP, Pleuritic pain, hemoptysis, fever/chills, n/v, flank pain, dysuria, hematuria, constipation, diarrhea, hematochezia, or melena. 82 yo female with PMHx of DM, HLD, HTN, HF (systolic, EF 17%, TTE 3/6/18), s/p Bioprosthetic AVR (2016) presents to ED with complaining of intermittent epigastric pain with burning-like quality, that radiates to the substernal chest, 9/10 severity that began about 1 month ago. Pain worse after she eats and c/o feeling "full" after 2 bites of food.  She also c/o SOB, worsening BOYKIN, productive clear cough, and b/l LE edema which all worsened for past 2 days. Pt's baseline BOYKIN with her ADLs such as walking from on room to another. However for past 2 days she is dyspneic with minima exertion, orthopneic and PND.   As per grandson, the patient also lost her appetite and was not sleeping 2/2 the symptoms.  He states, the pain and SOB seemed to be worse at night, starting at around 11pm and continuing till 6am, at which time she would finally be able to fall asleep for a short time.  Aggravating factors are laying down flat and walking, both causing increased HR and shortness of breath; an aberration from her normal baseline as she was priorly able to get around the house without this level of difficulty.  Grandson reports that the symptoms were mildly alleviated if she sat up straight and avoided laying down flat. Grandson states the patient is compliant with her medications.  He also reports she follows a healthy diet, but sometimes "asks for extra salt" with her meals.   Patient denies CP, Pleuritic pain, hemoptysis, fever/chills, n/v, flank pain, dysuria, hematuria, constipation, diarrhea, hematochezia, or melena.

## 2018-04-11 NOTE — H&P ADULT - CONSTITUTIONAL DETAILS
well-groomed/well-nourished/no distress/well-developed well-groomed/respiratory distress/well-developed/well-nourished

## 2018-04-11 NOTE — ED ADULT NURSE REASSESSMENT NOTE - NS ED NURSE REASSESS COMMENT FT1
Pt received this am anxious crying states upset wants to leave  Pt advise as to why she was admitted , pt refusing to wear cardiac monitor. Pt co iv to left arm insisting on having it removed pt states needs to go outside. Iv removed pt walked out states needs to get fresh air and will return.  . Pt denies nausea or vomiting no sign of tremors noted.

## 2018-04-11 NOTE — H&P ADULT - NSHPLABSRESULTS_GEN_ALL_CORE
CBC Full  -  ( 10 Apr 2018 22:09 )  WBC Count : 4.45 K/uL  Hemoglobin : 9.6 g/dL  Hematocrit : 31.1 %  Platelet Count - Automated : 95 K/uL  Mean Cell Volume : 80.6 fL  Mean Cell Hemoglobin : 24.9 pg  Mean Cell Hemoglobin Concentration : 30.9 %  Auto Neutrophil # : 2.51 K/uL  Auto Lymphocyte # : 1.21 K/uL  Auto Monocyte # : 0.60 K/uL  Auto Eosinophil # : 0.11 K/uL  Auto Basophil # : 0.01 K/uL  Auto Neutrophil % : 56.4 %  Auto Lymphocyte % : 27.2 %  Auto Monocyte % : 13.5 %  Auto Eosinophil % : 2.5 %  Auto Basophil % : 0.2 %  04-10    134<L>  |  99  |  24<H>  ----------------------------<  217<H>  4.0   |  23  |  1.05    Ca    8.4      10 Apr 2018 23:00    TPro  8.0  /  Alb  3.3  /  TBili  0.6  /  DBili  x   /  AST  26  /  ALT  13  /  AlkPhos  84  04-10  EKG Sinus Rhythm with 1 degree AV Block, LAD, LVH, TWI AVL and V6; and poor r-wave progression V1-V6 EKG Sinus Rhythm with 1 degree AV Block, LAD, LVH, TWI AVL and V6; and poor r-wave progression V1-V6  Yue x1: neg  ProBNP: 84506  CXR: Acute pulmonary edema  CT Abd+Pelv w/con: No acute findings on CT the abdomen and pelvis to explain patient's   abdominal pain. Small bilateral pleural effusions (right larger than the left) and mild interstitial lung edema.  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  UA: Large leuks, neg nitrates’ pt asymptomatic    TTE on 3/2018: 17%  Mitral Valve: An annuloplasty ring is seen in the mitral  position. Moderate mitral regurgitation.  Aortic Root: Mild aortic root dilatation  (Ao: 4.0 cm at  the sinuses of Valsalva).  Aortic Valve: Bioprosthetic aortic valve replacement. Peak  transaortic valve gradient equals 15 mm Hg, mean  transaortic valve gradient equals 8 mm Hg, which is  probably normal in the presence of a prosthetic valve. No  aortic valve regurgitation seen.  Left Atrium: Severely dilated left atrium.  LA volume index  = 52 cc/m2.  Left Ventricle: Severe global left ventricular systolic  dysfunction. Moderate left ventricular enlargement.  Right Heart: Normal right atrium. Normal right ventricular  size with decreased right ventricular systolic function. An  annuloplasty ring is seen in the tricuspid position.  Mild-moderate tricuspid regurgitation. Normal pulmonic  valve.  Mild-moderate pulmonic regurgitation.  Pericardium/PleuraNormal pericardium with no pericardial  effusion.  ------------------------

## 2018-04-11 NOTE — CONSULT NOTE ADULT - SUBJECTIVE AND OBJECTIVE BOX
Patient is a 83y old  Female who presents with a chief complaint of Abdominal pain and shortness of breath x 2 days (2018 07:55)          HPI:  Translation via patient's grandson   83 year old Romario speaking female with PMH of NICM, LVEF 40-45%, non-obstructive CAD, HTN, HLD, hypothyroidism, NIDDM s/p bioprosthetic AVR in  at [facility] presented to ED with acute dyspnea/cough.  Echo done on  showed LVEF 17%.  Patient has been maintained on optimal medical therapy with BB/ACEI/ diuretic for many years.                 PAST MEDICAL & SURGICAL HISTORY:  Heart failure, systolic, due to CAD  DM (diabetes mellitus)  HLD (hyperlipidemia)  HTN (hypertension)  Aortic valve replaced: bioprosthetic      MEDICATIONS  (STANDING):  dextrose 50% Injectable 12.5 Gram(s) IV Push once  dextrose 50% Injectable 25 Gram(s) IV Push once  dextrose 50% Injectable 25 Gram(s) IV Push once  enoxaparin Injectable 40 milliGRAM(s) SubCutaneous every 24 hours  famotidine    Tablet 20 milliGRAM(s) Oral every 12 hours  furosemide   Injectable 40 milliGRAM(s) IV Push every 12 hours  insulin lispro (HumaLOG) corrective regimen sliding scale   SubCutaneous three times a day before meals  insulin lispro (HumaLOG) corrective regimen sliding scale   SubCutaneous at bedtime  pantoprazole    Tablet 40 milliGRAM(s) Oral before breakfast    MEDICATIONS  (PRN):  dextrose Gel 1 Dose(s) Oral once PRN Blood Glucose LESS THAN 70 milliGRAM(s)/deciliter  glucagon  Injectable 1 milliGRAM(s) IntraMuscular once PRN Glucose LESS THAN 70 milligrams/deciliter    Allergies    Lime/Lemon (acidity products)- patients eyes get swollen (Swelling)  No Known Drug Allergies    Intolerances      FAMILY HISTORY:      SOCIAL HISTORY:  Denies smoking; no   Alcohol  or  Drug abuse               REVIEW OF SYSTEMS:    CONSTITUTIONAL: No fever, weight loss, chills, shakes, or fatigue  EYES: No eye pain, visual disturbances, or discharge  ENMT:  No difficulty hearing, tinnitus, vertigo; No sinus or throat pain  NECK: No pain or stiffness  RESPIRATORY: No cough, wheezing, hemoptysis, or shortness of breath  CARDIOVASCULAR: No chest pain, dyspnea, palpitations, dizziness, syncope, paroxysmal nocturnal dyspnea, orthopnea, or arm or leg swelling  GASTROINTESTINAL: No abdominal  or epigastric pain, nausea, vomiting, hematemesis, diarrhea, constipation, melena or bright red blood.  GENITOURINARY: No dysuria, nocturia, hematuria, or urinary incontinence  NEUROLOGICAL: No headaches, memory loss, slurred speech, limb weakness, loss of strength, numbness, or tremors  SKIN: No itching, burning, rashes, or lesions   LYMPH NODES: No enlarged glands  ENDOCRINE: No heat or cold intolerance, or hair loss  MUSCULOSKELETAL: No joint pain or swelling, muscle, back, or extremity pain  PSYCHIATRIC: No depression, anxiety, or difficulty sleeping  HEME/LYMPH: No easy bruising or bleeding gums  ALLERY AND IMMUNOLOGIC: No hives or rash.      Vital Signs Last 24 Hrs  T(C): 36.4 (2018 15:57), Max: 36.8 (10 Apr 2018 22:29)  T(F): 97.5 (2018 15:57), Max: 98.2 (10 Apr 2018 22:29)  HR: 69 (2018 15:57) (66 - 79)  BP: 128/71 (2018 15:57) (128/71 - 150/86)  BP(mean): --  RR: 18 (2018 15:57) (16 - 20)  SpO2: 100% (2018 15:57) (99% - 100%)    PHYSICAL EXAM:    GENERAL: In no apparent distress, well nourished, and hydrated.  HEAD:  Atraumatic, Normocephalic  NECK: Supple . No JVD or carotid bruit or thyroidmegaly.  Carotid pulse is 2+ bilaterally.  PULMONARY: Clear to auscultation and perfusion.  No rales, wheezing, or rhonchi bilaterally.  HEART: Regular rate and rhythm; No murmurs, rubs, or gallops.  ABDOMEN: Soft, Nontender, Nondistended; Bowel sounds present  EXTREMITIES: No clubbing, cyanosis, or edema  NEUROLOGICAL: Alert oriented to person, place and time.  Speech clear.  Skin: Dry intact, no rashes or lesions.          INTERPRETATION OF TELEMETRY:  Sinus rhythm with occasional PVC's/APC's    ECG:         LABS:                        9.7    4.47  )-----------( 103      ( 2018 09:00 )             31.5     04-10    134<L>  |  99  |  24<H>  ----------------------------<  217<H>  4.0   |  23  |  1.05    Ca    8.4      10 Apr 2018 23:00    TPro  8.0  /  Alb  3.3  /  TBili  0.6  /  DBili  x   /  AST  26  /  ALT  13  /  AlkPhos  84  04-10    CARDIAC MARKERS ( 2018 08:50 )  x     / < 0.06 ng/mL / 45 u/L / 2.26 ng/mL / x      CARDIAC MARKERS ( 10 Apr 2018 23:00 )  x     / < 0.06 ng/mL / 47 u/L / 2.30 ng/mL / x            Urinalysis Basic - ( 2018 01:19 )    Color: YELLOW / Appearance: HAZY / S.012 / pH: 6.0  Gluc: NEGATIVE / Ketone: NEGATIVE  / Bili: NEGATIVE / Urobili: NORMAL mg/dL   Blood: TRACE / Protein: 100 mg/dL / Nitrite: NEGATIVE   Leuk Esterase: LARGE / RBC: 10-25 / WBC 10-25   Sq Epi: FEW / Non Sq Epi: x / Bacteria: MOD        BNPSerum Pro-Brain Natriuretic Peptide: 59317 pg/mL (04-10 @ 23:00)    RADIOLOGY & ADDITIONAL STUDIES:  PREVIOUS DIAGNOSTIC TESTING:      ECHO FINDINGS:    STRESS FINDINGS:    CATHETERIZATION FINDINGS: Patient is a 83y old  Female who presents with a chief complaint of Abdominal pain and shortness of breath x 2 days (2018 07:55)          HPI:  Patient requests translation via patient's grandson; she declined Romario   83 year old Romario speaking female with PMH of chronic NICM for at least 7 years, LVEF 40-45%, non-obstructive CAD, HTN, HLD, hypothyroidism, NIDDM s/p bioprosthetic AVR in  at [facility] presented to ED with worsening dyspnea/orhtopnea.  A recent echo done on  showed LVEF 17%.  Patient has been maintained on optimal medical therapy with BB/ACEI/ diuretic and was adherent to her meds.  She denies palpitations, dizziness, CP or syncope.  EKG demonstrated SR with 1 st degree AV block at 75 bpm no VT/NSVT seen at bedside monitor.  Per review of her past medical record from her cardiologist, patient was admitted for acute HF to [facility] on 2016.       PAST MEDICAL & SURGICAL HISTORY:  Heart failure, systolic, due to CAD  DM (diabetes mellitus)  HLD (hyperlipidemia)  HTN (hypertension)  Aortic valve replaced: bioprosthetic      MEDICATIONS  (STANDING): home meds: Lisinopril; Metoprolol; Lasix; ASA; metformin; Januvia; Zocor  dextrose 50% Injectable 12.5 Gram(s) IV Push once  dextrose 50% Injectable 25 Gram(s) IV Push once  dextrose 50% Injectable 25 Gram(s) IV Push once  enoxaparin Injectable 40 milliGRAM(s) SubCutaneous every 24 hours  famotidine    Tablet 20 milliGRAM(s) Oral every 12 hours  furosemide   Injectable 40 milliGRAM(s) IV Push every 12 hours  insulin lispro (HumaLOG) corrective regimen sliding scale   SubCutaneous three times a day before meals  insulin lispro (HumaLOG) corrective regimen sliding scale   SubCutaneous at bedtime  pantoprazole    Tablet 40 milliGRAM(s) Oral before breakfast    MEDICATIONS  (PRN):  dextrose Gel 1 Dose(s) Oral once PRN Blood Glucose LESS THAN 70 milliGRAM(s)/deciliter  glucagon  Injectable 1 milliGRAM(s) IntraMuscular once PRN Glucose LESS THAN 70 milligrams/deciliter    Allergies    Lime/Lemon (acidity products)- patients eyes get swollen (Swelling)  No Known Drug Allergies    Intolerances      FAMILY HISTORY:      SOCIAL HISTORY:  Denies smoking; no   Alcohol  or  Drug abuse     REVIEW OF SYSTEMS:    CONSTITUTIONAL: No fever, weight loss, chills, shakes, or fatigue  EYES: No eye pain, visual disturbances, or discharge  ENMT:  No difficulty hearing, tinnitus, vertigo; No sinus or throat pain  NECK: No pain or stiffness  RESPIRATORY: No wheezing, hemoptysis, +shortness of breath; +cough  CARDIOVASCULAR: No chest pain , palpitations, dizziness, syncope, +paroxysmal nocturnal dyspnea, +orthopnea, + leg swelling +dyspnea  GASTROINTESTINAL: No abdominal  or epigastric pain, nausea, vomiting, hematemesis, diarrhea, constipation, melena or bright red blood.  GENITOURINARY: No dysuria, nocturia, hematuria, or urinary incontinence  NEUROLOGICAL: No headaches, memory loss, slurred speech, limb weakness, loss of strength, numbness, or tremors  SKIN: No itching, burning, rashes, or lesions   LYMPH NODES: No enlarged glands  ENDOCRINE: No heat or cold intolerance, or hair loss  MUSCULOSKELETAL: No joint pain or swelling, muscle, back, or extremity pain  PSYCHIATRIC: No depression, anxiety, or difficulty sleeping  HEME/LYMPH: No easy bruising or bleeding gums  ALLERY AND IMMUNOLOGIC: No hives or rash.      Vital Signs Last 24 Hrs  T(C): 36.4 (2018 15:57), Max: 36.8 (10 Apr 2018 22:29)  T(F): 97.5 (2018 15:57), Max: 98.2 (10 Apr 2018 22:29)  HR: 69 (2018 15:57) (66 - 79)  BP: 128/71 (2018 15:57) (128/71 - 150/86)  BP(mean): --  RR: 18 (2018 15:57) (16 - 20)  SpO2: 100% (2018 15:57) (99% - 100%)    PHYSICAL EXAM:    GENERAL: In no apparent distress, well nourished, and hydrated.  HEAD:  Atraumatic, Normocephalic  NECK: Supple . + mild JVD, no carotid bruit or thyroidmegaly.  Carotid pulse is 2+ bilaterally.  PULMONARY: Clear to auscultation and perfusion.  No rales, wheezing, or rhonchi bilaterally.  HEART: Regular rate and rhythm; 2/6 systolic murmurs, no rubs, or gallops.  ABDOMEN: Soft, Nontender, Nondistended; Bowel sounds present  EXTREMITIES: No clubbing, cyanosis, +1 pitting edema  NEUROLOGICAL: Alert oriented to person, place and time.  Speech clear.  Skin: Dry intact, no rashes or lesions.          INTERPRETATION OF TELEMETRY:  Sinus rhythm with occasional PVC's/APC's    ECG: SR with 1 st degree AV block at 75; left axis deviation, QRS 122ms; TWI lateral leads; QT/QTc 446/498ms        LABS:                        9.7    4.47  )-----------( 103      ( 2018 09:00 )             31.5     04-10    134<L>  |  99  |  24<H>  ----------------------------<  217<H>  4.0   |  23  |  1.05    Ca    8.4      10 Apr 2018 23:00    TPro  8.0  /  Alb  3.3  /  TBili  0.6  /  DBili  x   /  AST  26  /  ALT  13  /  AlkPhos  84  -10    CARDIAC MARKERS ( 2018 08:50 )  x     / < 0.06 ng/mL / 45 u/L / 2.26 ng/mL / x      CARDIAC MARKERS ( 10 Apr 2018 23:00 )  x     / < 0.06 ng/mL / 47 u/L / 2.30 ng/mL / x            Urinalysis Basic - ( 2018 01:19 )    Color: YELLOW / Appearance: HAZY / S.012 / pH: 6.0  Gluc: NEGATIVE / Ketone: NEGATIVE  / Bili: NEGATIVE / Urobili: NORMAL mg/dL   Blood: TRACE / Protein: 100 mg/dL / Nitrite: NEGATIVE   Leuk Esterase: LARGE / RBC: 10-25 / WBC 10-25   Sq Epi: FEW / Non Sq Epi: x / Bacteria: MOD        BNPSerum Pro-Brain Natriuretic Peptide: 54804 pg/mL (04-10 @ 23:00)    RADIOLOGY & ADDITIONAL STUDIES:  PREVIOUS DIAGNOSTIC TESTING:      ECHO FINDINGS:  3/6/2018  CONCLUSIONS:  1. An annuloplasty ring is seen in the mitral position.  Moderate mitral regurgitation.  2. Bioprosthetic aortic valve replacement. Peak transaortic  valve gradient equals 15 mm Hg, mean transaortic valve  gradient equals 8 mm Hg, which is probably normal in the  presence of a prosthetic valve. No aortic valve  regurgitation seen.    STRESS FINDINGS:    CATHETERIZATION FINDINGS:

## 2018-04-11 NOTE — H&P ADULT - NEGATIVE OPHTHALMOLOGIC SYMPTOMS
no pain R/no photophobia/no pain L/no lacrimation L/no lacrimation R/no diplopia/no blurred vision L/no discharge L/no blurred vision R/no discharge R

## 2018-04-11 NOTE — H&P ADULT - ATTENDING COMMENTS
EKG - NSR LBBB  Echo - 3/18 - Severely decreased LV s/p AVR tricuspid and mitral annuloplasty     a/p     1) Acute on chronic systolic CHF exacerbation - cont IV lasix for BIV ICD tomorrow    2) DM2 - on insulin     3) DVT prophylasix - cont lovenox

## 2018-04-11 NOTE — H&P ADULT - PROBLEM SELECTOR PLAN 6
continue with Metoprolol, Lisinopril Monitor BP daily  DASH diet  continue with Metoprolol, Lisinopril

## 2018-04-11 NOTE — H&P ADULT - PROBLEM SELECTOR PLAN 4
Monitor blood glucose at meals and before bedtime;  place patient on Insulin sliding scale Monitor blood glucose at meals and before bedtime;  place patient on Insulin sliding scale  HgA1c

## 2018-04-11 NOTE — H&P ADULT - GASTROINTESTINAL DETAILS
no rigidity/no distention/no rebound tenderness/bowel sounds normal/soft/no masses palpable/no guarding

## 2018-04-12 LAB
BACTERIA UR CULT: SIGNIFICANT CHANGE UP
BUN SERPL-MCNC: 23 MG/DL — SIGNIFICANT CHANGE UP (ref 7–23)
CALCIUM SERPL-MCNC: 8.5 MG/DL — SIGNIFICANT CHANGE UP (ref 8.4–10.5)
CHLORIDE SERPL-SCNC: 101 MMOL/L — SIGNIFICANT CHANGE UP (ref 98–107)
CO2 SERPL-SCNC: 25 MMOL/L — SIGNIFICANT CHANGE UP (ref 22–31)
CREAT SERPL-MCNC: 1.07 MG/DL — SIGNIFICANT CHANGE UP (ref 0.5–1.3)
GLUCOSE SERPL-MCNC: 70 MG/DL — SIGNIFICANT CHANGE UP (ref 70–99)
HCT VFR BLD CALC: 28.9 % — LOW (ref 34.5–45)
HGB BLD-MCNC: 9.2 G/DL — LOW (ref 11.5–15.5)
MCHC RBC-ENTMCNC: 25.1 PG — LOW (ref 27–34)
MCHC RBC-ENTMCNC: 31.8 % — LOW (ref 32–36)
MCV RBC AUTO: 78.7 FL — LOW (ref 80–100)
NRBC # FLD: 0 — SIGNIFICANT CHANGE UP
PLATELET # BLD AUTO: 102 K/UL — LOW (ref 150–400)
PMV BLD: 12.1 FL — SIGNIFICANT CHANGE UP (ref 7–13)
POTASSIUM SERPL-MCNC: 3.1 MMOL/L — LOW (ref 3.5–5.3)
POTASSIUM SERPL-SCNC: 3.1 MMOL/L — LOW (ref 3.5–5.3)
RBC # BLD: 3.67 M/UL — LOW (ref 3.8–5.2)
RBC # FLD: 18.4 % — HIGH (ref 10.3–14.5)
SODIUM SERPL-SCNC: 139 MMOL/L — SIGNIFICANT CHANGE UP (ref 135–145)
SPECIMEN SOURCE: SIGNIFICANT CHANGE UP
WBC # BLD: 4.23 K/UL — SIGNIFICANT CHANGE UP (ref 3.8–10.5)
WBC # FLD AUTO: 4.23 K/UL — SIGNIFICANT CHANGE UP (ref 3.8–10.5)

## 2018-04-12 RX ORDER — CHLORHEXIDINE GLUCONATE 213 G/1000ML
1 SOLUTION TOPICAL ONCE
Qty: 0 | Refills: 0 | Status: COMPLETED | OUTPATIENT
Start: 2018-04-12 | End: 2018-04-15

## 2018-04-12 RX ORDER — SODIUM CHLORIDE 9 MG/ML
3 INJECTION INTRAMUSCULAR; INTRAVENOUS; SUBCUTANEOUS EVERY 8 HOURS
Qty: 0 | Refills: 0 | Status: DISCONTINUED | OUTPATIENT
Start: 2018-04-12 | End: 2018-04-15

## 2018-04-12 RX ORDER — POTASSIUM CHLORIDE 20 MEQ
40 PACKET (EA) ORAL EVERY 4 HOURS
Qty: 0 | Refills: 0 | Status: COMPLETED | OUTPATIENT
Start: 2018-04-12 | End: 2018-04-12

## 2018-04-12 RX ADMIN — Medication 40 MILLIEQUIVALENT(S): at 13:32

## 2018-04-12 RX ADMIN — FAMOTIDINE 20 MILLIGRAM(S): 10 INJECTION INTRAVENOUS at 18:35

## 2018-04-12 RX ADMIN — FAMOTIDINE 20 MILLIGRAM(S): 10 INJECTION INTRAVENOUS at 05:52

## 2018-04-12 RX ADMIN — ENOXAPARIN SODIUM 40 MILLIGRAM(S): 100 INJECTION SUBCUTANEOUS at 18:38

## 2018-04-12 RX ADMIN — Medication 40 MILLIEQUIVALENT(S): at 09:49

## 2018-04-12 RX ADMIN — Medication 2: at 13:26

## 2018-04-12 RX ADMIN — Medication 40 MILLIGRAM(S): at 18:38

## 2018-04-12 RX ADMIN — Medication 2: at 18:30

## 2018-04-12 RX ADMIN — Medication 40 MILLIEQUIVALENT(S): at 18:34

## 2018-04-12 RX ADMIN — Medication 40 MILLIGRAM(S): at 05:52

## 2018-04-12 RX ADMIN — SODIUM CHLORIDE 3 MILLILITER(S): 9 INJECTION INTRAMUSCULAR; INTRAVENOUS; SUBCUTANEOUS at 22:46

## 2018-04-12 RX ADMIN — PANTOPRAZOLE SODIUM 40 MILLIGRAM(S): 20 TABLET, DELAYED RELEASE ORAL at 05:52

## 2018-04-12 NOTE — PATIENT PROFILE ADULT. - HEALTH/HEALTHCARE ANXIETIES, PROFILE
Pt prefers to have family at bedside and the use of  phone. Pt states she feels very claustrophobic in the room here.

## 2018-04-12 NOTE — PROGRESS NOTE ADULT - SUBJECTIVE AND OBJECTIVE BOX
S/24 Events: Patient seen and examined. Denies CP, SOB, dizziness, LH, near syncope or sycope.   No acute events overnight.   Able to lay flat without SOB. Grandson at bedside  Telemetry : -SR, !*AVB  O:  T(C): 37 (18 @ 18:39), Max: 37.1 (18 @ 01:46)  HR: 88 (18 @ 18:39) (70 - 88)  BP: 133/73 (18 @ 18:57) (119/67 - 157/71)  RR: 18 (18 @ 18:39) (17 - 20)  SpO2: 98% (18 @ 18:39) (96% - 100%)  Wt(kg): --  Daily Height in cm: 167.64 (2018 02:05)    Daily Weight in k.1 (2018 05:49)  Gen: NAD  HEENT: EOMI  CV: RRR, normal S1 + S2, no m/r/g  Lungs: CTAB  Abd: soft, non-tender  Ext: No edema    Labs:                        9.2    4.23  )-----------( 102      ( 2018 05:00 )             28.9     04-12    139  |  101  |  23  ----------------------------<  70  3.1<L>   |  25  |  1.07    Ca    8.5      2018 05:00    TPro  8.0  /  Alb  3.3  /  TBili  0.6  /  DBili  x   /  AST  26  /  ALT  13  /  AlkPhos  84  04-10      CARDIAC MARKERS ( 2018 08:50 )  x     / < 0.06 ng/mL / 45 u/L / 2.26 ng/mL / x      CARDIAC MARKERS ( 10 Apr 2018 23:00 )  x     / < 0.06 ng/mL / 47 u/L / 2.30 ng/mL / x           DIagnostics :       Meds:  MEDICATIONS  (STANDING):  chlorhexidine 4% Liquid 1 Application(s) Topical once  dextrose 50% Injectable 12.5 Gram(s) IV Push once  dextrose 50% Injectable 25 Gram(s) IV Push once  dextrose 50% Injectable 25 Gram(s) IV Push once  enoxaparin Injectable 40 milliGRAM(s) SubCutaneous every 24 hours  famotidine    Tablet 20 milliGRAM(s) Oral every 12 hours  furosemide   Injectable 40 milliGRAM(s) IV Push every 12 hours  insulin lispro (HumaLOG) corrective regimen sliding scale   SubCutaneous three times a day before meals  insulin lispro (HumaLOG) corrective regimen sliding scale   SubCutaneous at bedtime  pantoprazole    Tablet 40 milliGRAM(s) Oral before breakfast  sodium chloride 0.9% lock flush 3 milliLiter(s) IV Push every 8 hours      A/P:  83 year old Romario speaking female with PMH of NICM, chronic HFrEF, LVEF 40-45%, NYHA class II-III, non-obstructive CAD, HTN, HLD, hypothyroidism, NIDDM s/p bioprosthetic AVR  now presented with acute decompensated HF.  A recent echo done on  showed LVEF 17%.  Considering her severe systolic LV dysfunction despite optimal medical therapy for many years and Class II-III HF, patient meets criteria for ICD for primary prevention of SCD sudden cardiac death.    -Continue HF treatment per primary team  -Recommend ICD for primary prevention of SCD when euvolemic  -Will follow up  -Pl Keep K >4.0 and Mag> 1.8    - Consented in [facility] for BiVICD tm.   - NPO after MN     thank you.     Dora Benedict, FNP-C  [id number]

## 2018-04-13 ENCOUNTER — TRANSCRIPTION ENCOUNTER (OUTPATIENT)
Age: 83
End: 2018-04-13

## 2018-04-13 LAB
ALBUMIN SERPL ELPH-MCNC: 3.5 G/DL — SIGNIFICANT CHANGE UP (ref 3.3–5)
ALP SERPL-CCNC: 87 U/L — SIGNIFICANT CHANGE UP (ref 40–120)
ALT FLD-CCNC: 14 U/L — SIGNIFICANT CHANGE UP (ref 4–33)
AST SERPL-CCNC: 25 U/L — SIGNIFICANT CHANGE UP (ref 4–32)
BILIRUB SERPL-MCNC: 0.7 MG/DL — SIGNIFICANT CHANGE UP (ref 0.2–1.2)
BLD GP AB SCN SERPL QL: NEGATIVE — SIGNIFICANT CHANGE UP
BUN SERPL-MCNC: 23 MG/DL — SIGNIFICANT CHANGE UP (ref 7–23)
BUN SERPL-MCNC: 25 MG/DL — HIGH (ref 7–23)
CALCIUM SERPL-MCNC: 8.7 MG/DL — SIGNIFICANT CHANGE UP (ref 8.4–10.5)
CALCIUM SERPL-MCNC: 8.8 MG/DL — SIGNIFICANT CHANGE UP (ref 8.4–10.5)
CHLORIDE SERPL-SCNC: 100 MMOL/L — SIGNIFICANT CHANGE UP (ref 98–107)
CHLORIDE SERPL-SCNC: 101 MMOL/L — SIGNIFICANT CHANGE UP (ref 98–107)
CO2 SERPL-SCNC: 22 MMOL/L — SIGNIFICANT CHANGE UP (ref 22–31)
CO2 SERPL-SCNC: 27 MMOL/L — SIGNIFICANT CHANGE UP (ref 22–31)
CREAT SERPL-MCNC: 1.18 MG/DL — SIGNIFICANT CHANGE UP (ref 0.5–1.3)
CREAT SERPL-MCNC: 1.21 MG/DL — SIGNIFICANT CHANGE UP (ref 0.5–1.3)
GLUCOSE SERPL-MCNC: 255 MG/DL — HIGH (ref 70–99)
GLUCOSE SERPL-MCNC: 292 MG/DL — HIGH (ref 70–99)
HCT VFR BLD CALC: 30.7 % — LOW (ref 34.5–45)
HGB BLD-MCNC: 9.6 G/DL — LOW (ref 11.5–15.5)
INR BLD: 1.26 — HIGH (ref 0.88–1.17)
MAGNESIUM SERPL-MCNC: 1.9 MG/DL — SIGNIFICANT CHANGE UP (ref 1.6–2.6)
MCHC RBC-ENTMCNC: 25 PG — LOW (ref 27–34)
MCHC RBC-ENTMCNC: 31.3 % — LOW (ref 32–36)
MCV RBC AUTO: 79.9 FL — LOW (ref 80–100)
NRBC # FLD: 0 — SIGNIFICANT CHANGE UP
NT-PROBNP SERPL-SCNC: SIGNIFICANT CHANGE UP PG/ML
PLATELET # BLD AUTO: 106 K/UL — LOW (ref 150–400)
PMV BLD: 12.9 FL — SIGNIFICANT CHANGE UP (ref 7–13)
POTASSIUM SERPL-MCNC: 4.4 MMOL/L — SIGNIFICANT CHANGE UP (ref 3.5–5.3)
POTASSIUM SERPL-MCNC: 4.4 MMOL/L — SIGNIFICANT CHANGE UP (ref 3.5–5.3)
POTASSIUM SERPL-SCNC: 4.4 MMOL/L — SIGNIFICANT CHANGE UP (ref 3.5–5.3)
POTASSIUM SERPL-SCNC: 4.4 MMOL/L — SIGNIFICANT CHANGE UP (ref 3.5–5.3)
PROT SERPL-MCNC: 7.9 G/DL — SIGNIFICANT CHANGE UP (ref 6–8.3)
PROTHROM AB SERPL-ACNC: 14.1 SEC — HIGH (ref 9.8–13.1)
RBC # BLD: 3.84 M/UL — SIGNIFICANT CHANGE UP (ref 3.8–5.2)
RBC # FLD: 18.2 % — HIGH (ref 10.3–14.5)
RH IG SCN BLD-IMP: NEGATIVE — SIGNIFICANT CHANGE UP
RH IG SCN BLD-IMP: NEGATIVE — SIGNIFICANT CHANGE UP
SODIUM SERPL-SCNC: 138 MMOL/L — SIGNIFICANT CHANGE UP (ref 135–145)
SODIUM SERPL-SCNC: 140 MMOL/L — SIGNIFICANT CHANGE UP (ref 135–145)
WBC # BLD: 3.54 K/UL — LOW (ref 3.8–10.5)
WBC # FLD AUTO: 3.54 K/UL — LOW (ref 3.8–10.5)

## 2018-04-13 PROCEDURE — 33249 INSJ/RPLCMT DEFIB W/LEAD(S): CPT

## 2018-04-13 PROCEDURE — 71045 X-RAY EXAM CHEST 1 VIEW: CPT | Mod: 26

## 2018-04-13 PROCEDURE — 93010 ELECTROCARDIOGRAM REPORT: CPT

## 2018-04-13 PROCEDURE — 33225 L VENTRIC PACING LEAD ADD-ON: CPT

## 2018-04-13 RX ORDER — VANCOMYCIN HCL 1 G
1000 VIAL (EA) INTRAVENOUS ONCE
Qty: 0 | Refills: 0 | Status: COMPLETED | OUTPATIENT
Start: 2018-04-14 | End: 2018-04-14

## 2018-04-13 RX ORDER — FAMOTIDINE 10 MG/ML
20 INJECTION INTRAVENOUS ONCE
Qty: 0 | Refills: 0 | Status: COMPLETED | OUTPATIENT
Start: 2018-04-13 | End: 2018-04-13

## 2018-04-13 RX ADMIN — Medication 1: at 18:09

## 2018-04-13 RX ADMIN — SODIUM CHLORIDE 3 MILLILITER(S): 9 INJECTION INTRAMUSCULAR; INTRAVENOUS; SUBCUTANEOUS at 20:25

## 2018-04-13 RX ADMIN — FAMOTIDINE 20 MILLIGRAM(S): 10 INJECTION INTRAVENOUS at 05:43

## 2018-04-13 RX ADMIN — SODIUM CHLORIDE 3 MILLILITER(S): 9 INJECTION INTRAMUSCULAR; INTRAVENOUS; SUBCUTANEOUS at 05:23

## 2018-04-13 RX ADMIN — Medication 1: at 22:32

## 2018-04-13 RX ADMIN — Medication 40 MILLIGRAM(S): at 18:09

## 2018-04-13 RX ADMIN — FAMOTIDINE 20 MILLIGRAM(S): 10 INJECTION INTRAVENOUS at 18:09

## 2018-04-13 RX ADMIN — Medication 40 MILLIGRAM(S): at 05:43

## 2018-04-13 NOTE — DISCHARGE NOTE ADULT - CARE PROVIDER_API CALL
BRENDON Badillo  [address] [facility] [address]  Phone: [phone]  Fax: (       -

## 2018-04-13 NOTE — DISCHARGE NOTE ADULT - PATIENT PORTAL LINK FT
You can access the [facility] Patient Portal, offered by [facility], by registering with the following website: [email or web address]

## 2018-04-13 NOTE — PROGRESS NOTE ADULT - SUBJECTIVE AND OBJECTIVE BOX
Jermaine Curry MD  Interventional Cardiology  [place] Office : [phone] [place] [address]  Tel:   [place] Office : [address] [facility] [place] [address]  Tel: [phone]  Cell : [phone]    Subjective : Pt lying in bed comfortable, not in distress, denies any chest pain or SOB  	  MEDICATIONS:  furosemide   Injectable 40 milliGRAM(s) IV Push every 12 hours  famotidine    Tablet 20 milliGRAM(s) Oral every 12 hours  pantoprazole    Tablet 40 milliGRAM(s) Oral before breakfast    dextrose 50% Injectable 12.5 Gram(s) IV Push once  dextrose 50% Injectable 25 Gram(s) IV Push once  dextrose 50% Injectable 25 Gram(s) IV Push once  dextrose Gel 1 Dose(s) Oral once PRN  glucagon  Injectable 1 milliGRAM(s) IntraMuscular once PRN  insulin lispro (HumaLOG) corrective regimen sliding scale   SubCutaneous three times a day before meals  insulin lispro (HumaLOG) corrective regimen sliding scale   SubCutaneous at bedtime    chlorhexidine 4% Liquid 1 Application(s) Topical once  sodium chloride 0.9% lock flush 3 milliLiter(s) IV Push every 8 hours      PHYSICAL EXAM:  T(C): 36.4 (04-13-18 @ 21:03), Max: 36.8 (04-13-18 @ 17:24)  HR: 86 (04-13-18 @ 21:03) (86 - 106)  BP: 131/80 (04-13-18 @ 21:03) (118/63 - 159/89)  RR: 17 (04-13-18 @ 21:03) (16 - 17)  SpO2: 97% (04-13-18 @ 21:03) (97% - 99%)  Wt(kg): --  I&O's Summary    12 Apr 2018 07:01  -  13 Apr 2018 07:00  --------------------------------------------------------  IN: 0 mL / OUT: 300 mL / NET: -300 mL    13 Apr 2018 07:01  -  13 Apr 2018 22:31  --------------------------------------------------------  IN: 360 mL / OUT: 400 mL / NET: -40 mL      Height (cm): 167.64 (04-13 @ 04:32)  Weight (kg): 72.2 (04-13 @ 04:35)  BMI (kg/m2): 25.7 (04-13 @ 04:35)  BSA (m2): 1.82 (04-13 @ 04:35)    Appearance: Normal	  HEENT:   Normal oral mucosa, PERRL, EOMI	  Cardiovascular: Normal S1 S2, No JVD, No murmurs, No edema  Respiratory: Lungs clear to auscultation	  Gastrointestinal:  Soft, Non-tender, + BS	  Extremities: Normal range of motion, No clubbing, cyanosis or edema                                    9.6    3.54  )-----------( 106      ( 13 Apr 2018 04:41 )             30.7     04-13    140  |  101  |  23  ----------------------------<  255<H>  4.4   |  22  |  1.18    Ca    8.7      13 Apr 2018 04:41  Mg     1.9     04-13    TPro  7.9  /  Alb  3.5  /  TBili  0.7  /  DBili  x   /  AST  25  /  ALT  14  /  AlkPhos  87  04-12    proBNP: Serum Pro-Brain Natriuretic Peptide: 71227 pg/mL (04-13 @ 04:41)    Lipid Profile:   HgA1c:   TSH:

## 2018-04-13 NOTE — CHART NOTE - NSCHARTNOTEFT_GEN_A_CORE
s/p BiV ICD ([employer]) implantation.  Device teaching done with patient and her family at bedside.  Follow up in device clinic on 4/26 at 3pm.

## 2018-04-13 NOTE — CHART NOTE - NSCHARTNOTEFT_GEN_A_CORE
BiV ICD implantation  Via left subclavian vein  Operators:   Anesthesia: Dr. Rosenthal  Dx: chronic systolic congestive heart failure class III, QRS duration 135 msec.   Prepectoral  Closure 3 layers.  Plan: ECG, CXR, ABx bedrest, hold AC

## 2018-04-13 NOTE — DISCHARGE NOTE ADULT - CARE PLAN
Principal Discharge DX:	Acute on chronic congestive heart failure, unspecified heart failure type  Goal:	prevent reoccurrence S/P DEVICE CLINIC 4/26/18 at 3 PM Oncology Building 4 Th floor [facility]   Assessment and plan of treatment:	cont daily weights, diet ,medications  Secondary Diagnosis:	CAD (coronary artery disease)  Assessment and plan of treatment:	cont medications and diet  DAVID Badillo call for appointment 1 to 2 weeks  Secondary Diagnosis:	DM (diabetes mellitus)  Assessment and plan of treatment:	cont medications and diet  Secondary Diagnosis:	HLD (hyperlipidemia)  Assessment and plan of treatment:	cont medications and diet  Secondary Diagnosis:	HTN (hypertension)  Assessment and plan of treatment:	cont medications and diet

## 2018-04-13 NOTE — DISCHARGE NOTE ADULT - MEDICATION SUMMARY - MEDICATIONS TO CHANGE
I will SWITCH the dose or number of times a day I take the medications listed below when I get home from the hospital:  None I will SWITCH the dose or number of times a day I take the medications listed below when I get home from the hospital:    carvedilol 6.25 mg oral tablet  -- 1 tab(s) by mouth every 12 hours

## 2018-04-13 NOTE — DISCHARGE NOTE ADULT - PROVIDER TOKENS
FREE:[LAST:[Justino],FIRST:[BRENDON CALZADA],PHONE:[phone]],FAX:[(   )    -],ADDRESS:[[address] [place] [address]]]

## 2018-04-13 NOTE — DISCHARGE NOTE ADULT - PLAN OF CARE
prevent reoccurrence S/P DEVICE CLINIC 4/26/18 at 3 PM Oncology Building 4 Th floor [facility]  cont daily weights, diet ,medications cont medications and diet  FU Dr Badillo call for appointment 1 to 2 weeks cont medications and diet

## 2018-04-13 NOTE — DISCHARGE NOTE ADULT - MEDICATION SUMMARY - MEDICATIONS TO TAKE
I will START or STAY ON the medications listed below when I get home from the hospital:    aspirin 81 mg oral tablet  -- 1 tab(s) by mouth once a day  -- Indication: For CAD (coronary artery disease)    acetaminophen 325 mg oral tablet  -- 2 tab(s) by mouth every 6 hours, As needed, Mild to moderate pain  -- Indication: For Pain    lisinopril 5 mg oral tablet  -- 1 tab(s) by mouth once a day  -- Indication: For Acute on chronic congestive heart failure, unspecified heart failure type    Januvia 100 mg oral tablet  -- 1 tab(s) by mouth once a day  -- Indication: For DM (diabetes mellitus)    metFORMIN 500 mg oral tablet  -- orally 3 times a day  -- Indication: For DM (diabetes mellitus)    glimepiride 2 mg oral tablet  -- 1 tab(s) by mouth 2 times a day  -- Indication: For DM (diabetes mellitus)    Lipitor  -- 10 milligram(s) by mouth once a day (at bedtime)  -- Indication: For HLD (hyperlipidemia)    carvedilol 6.25 mg oral tablet  -- 1 tab(s) by mouth every 12 hours  -- Indication: For Acute on chronic congestive heart failure, unspecified heart failure type    furosemide 40 mg oral tablet  -- 1 tab(s) by mouth once a day  -- Indication: For Acute on chronic congestive heart failure, unspecified heart failure type    raNITIdine 150 mg oral capsule  -- 1 cap(s) by mouth 2 times a day  -- Indication: For GERD    omeprazole 40 mg oral delayed release capsule  -- 1 cap(s) by mouth once a day  -- Indication: For GERD    Oyster Shell Calcium with Vitamin D  -- 1 tab(s) by mouth 2 times a day  -- Indication: For Nutrition I will START or STAY ON the medications listed below when I get home from the hospital:    aspirin 81 mg oral tablet  -- 1 tab(s) by mouth once a day  -- Indication: For CAD (coronary artery disease)    acetaminophen 325 mg oral tablet  -- 2 tab(s) by mouth every 6 hours, As needed, Mild to moderate pain  -- Indication: For Pain    lisinopril 5 mg oral tablet  -- 1 tab(s) by mouth once a day  -- Indication: For Acute on chronic congestive heart failure, unspecified heart failure type    Januvia 100 mg oral tablet  -- 1 tab(s) by mouth once a day  -- Indication: For DM (diabetes mellitus)    metFORMIN 500 mg oral tablet  -- orally 3 times a day  -- Indication: For DM (diabetes mellitus)    glimepiride 2 mg oral tablet  -- 1 tab(s) by mouth 2 times a day  -- Indication: For DM (diabetes mellitus)    Lipitor  -- 10 milligram(s) by mouth once a day (at bedtime)  -- Indication: For HLD (hyperlipidemia)    carvedilol 6.25 mg oral tablet  -- 1 tab(s) by mouth every 12 hours  -- Indication: For HTN (hypertension)    furosemide 40 mg oral tablet  -- 1 tab(s) by mouth once a day  -- Indication: For Acute on chronic congestive heart failure, unspecified heart failure type    raNITIdine 150 mg oral capsule  -- 1 cap(s) by mouth 2 times a day  -- Indication: For GERD    omeprazole 40 mg oral delayed release capsule  -- 1 cap(s) by mouth once a day  -- Indication: For GERD    Oyster Shell Calcium with Vitamin D  -- 1 tab(s) by mouth 2 times a day  -- Indication: For Nutrition

## 2018-04-13 NOTE — DISCHARGE NOTE ADULT - HOSPITAL COURSE
84 yo female with PMHx of DM, HLD, HTN, HF (systolic, EF 17%, TTE 3/6/18), s/p AVR (2016) p/w epigastric pain x 1 month, and worsening BOYKIN, now dyspnea w/ minimal exertion, orthopnea, PND and worsening LE edema, found to have elevated proBNP and Acute Pulm Edema on CXR admitted to [facility] for Atypical Chest pain, r/o ACS and Acute on Chronic systolic HF, r/o ACS.    +CHF-->IV Lasix 40mg BID, EP  +Thrombocytopenia    PROC:  4/13 EP: s/p BiV ICD placement  EKG Sinus Rhythm with 1 degree AV Block, LAD, LVH, TWI AVL and V6; and poor r-wave progression V1-V6  Yue x2: neg  ProBNP: 11381  CXR: Acute pulmonary edema  CT Abd+Pelv w/con: No acute findings on CT the abdomen and pelvis to explain patient's   abdominal pain. Small bilateral pleural effusions (right larger than the left) and mild interstitial lung edema.  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  UA: Large leuks, neg nitrates’ pt asymptomatic    TTE on 3/2018: 17%  Mitral Valve: An annuloplasty ring is seen in the mitral  position. Moderate mitral regurgitation.  Aortic Root: Mild aortic root dilatation  (Ao: 4.0 cm at  the sinuses of Valsalva).  Aortic Valve: Bioprosthetic aortic valve replacement. Peak  transaortic valve gradient equals 15 mm Hg, mean  transaortic valve gradient equals 8 mm Hg, which is  probably normal in the presence of a prosthetic valve. No  aortic valve regurgitation seen.  Left Atrium: Severely dilated left atrium.  LA volume index  = 52 cc/m2.  Left Ventricle: Severe global left ventricular systolic  dysfunction. Moderate left ventricular enlargement.  Right Heart: Normal right atrium. Normal right ventricular  size with decreased right ventricular systolic function. An  annuloplasty ring is seen in the tricuspid position.  Mild-moderate tricuspid regurgitation. Normal pulmonic  valve.  Mild-moderate pulmonic regurgitation.  Pericardium/PleuraNormal pericardium with no pericardial  effusion.  ------------------------    4/13 EP: s/p BiV ICD placement,CXR No Pneumo ,pacemaker in plae   4/14 pt stable for d/c home today FU Dr Badillo

## 2018-04-13 NOTE — CHART NOTE - NSCHARTNOTEFT_GEN_A_CORE
NPO for BIV ICD today.  Via [facility]  ID [id number], patient denies orthopnea and able to lay flat in bed.  Assessment see preprocedure record.  d/c Lovenox.  Device teaching provided.

## 2018-04-13 NOTE — PROGRESS NOTE ADULT - ASSESSMENT
EKG - NSR LBBB  Echo - 3/18 - Severely decreased LV s/p AVR tricuspid and mitral annuloplasty     a/p     1) Acute on chronic systolic CHF exacerbation - cont IV lasix for BIV ICD today    2) DM2 - on insulin     3) DVT prophylasix - cont lovenox

## 2018-04-14 LAB
BUN SERPL-MCNC: 19 MG/DL — SIGNIFICANT CHANGE UP (ref 7–23)
CALCIUM SERPL-MCNC: 8.8 MG/DL — SIGNIFICANT CHANGE UP (ref 8.4–10.5)
CHLORIDE SERPL-SCNC: 95 MMOL/L — LOW (ref 98–107)
CO2 SERPL-SCNC: 25 MMOL/L — SIGNIFICANT CHANGE UP (ref 22–31)
CREAT SERPL-MCNC: 1.16 MG/DL — SIGNIFICANT CHANGE UP (ref 0.5–1.3)
GLUCOSE SERPL-MCNC: 196 MG/DL — HIGH (ref 70–99)
HCT VFR BLD CALC: 33.4 % — LOW (ref 34.5–45)
HGB BLD-MCNC: 10.4 G/DL — LOW (ref 11.5–15.5)
MAGNESIUM SERPL-MCNC: 1.9 MG/DL — SIGNIFICANT CHANGE UP (ref 1.6–2.6)
MCHC RBC-ENTMCNC: 24.8 PG — LOW (ref 27–34)
MCHC RBC-ENTMCNC: 31.1 % — LOW (ref 32–36)
MCV RBC AUTO: 79.5 FL — LOW (ref 80–100)
NRBC # FLD: 0 — SIGNIFICANT CHANGE UP
PLATELET # BLD AUTO: 99 K/UL — LOW (ref 150–400)
PMV BLD: SIGNIFICANT CHANGE UP FL (ref 7–13)
POTASSIUM SERPL-MCNC: 4 MMOL/L — SIGNIFICANT CHANGE UP (ref 3.5–5.3)
POTASSIUM SERPL-SCNC: 4 MMOL/L — SIGNIFICANT CHANGE UP (ref 3.5–5.3)
RBC # BLD: 4.2 M/UL — SIGNIFICANT CHANGE UP (ref 3.8–5.2)
RBC # FLD: 18.5 % — HIGH (ref 10.3–14.5)
SODIUM SERPL-SCNC: 134 MMOL/L — LOW (ref 135–145)
WBC # BLD: 4.7 K/UL — SIGNIFICANT CHANGE UP (ref 3.8–10.5)
WBC # FLD AUTO: 4.7 K/UL — SIGNIFICANT CHANGE UP (ref 3.8–10.5)

## 2018-04-14 RX ORDER — LISINOPRIL 2.5 MG/1
1 TABLET ORAL
Qty: 0 | Refills: 0 | COMMUNITY
Start: 2018-04-14

## 2018-04-14 RX ORDER — ACETAMINOPHEN 500 MG
650 TABLET ORAL EVERY 6 HOURS
Qty: 0 | Refills: 0 | Status: DISCONTINUED | OUTPATIENT
Start: 2018-04-14 | End: 2018-04-15

## 2018-04-14 RX ORDER — CARVEDILOL PHOSPHATE 80 MG/1
6.25 CAPSULE, EXTENDED RELEASE ORAL EVERY 12 HOURS
Qty: 0 | Refills: 0 | Status: DISCONTINUED | OUTPATIENT
Start: 2018-04-14 | End: 2018-04-15

## 2018-04-14 RX ORDER — ACETAMINOPHEN 500 MG
650 TABLET ORAL ONCE
Qty: 0 | Refills: 0 | Status: COMPLETED | OUTPATIENT
Start: 2018-04-14 | End: 2018-04-14

## 2018-04-14 RX ORDER — ACETAMINOPHEN 500 MG
2 TABLET ORAL
Qty: 0 | Refills: 0 | COMMUNITY
Start: 2018-04-14

## 2018-04-14 RX ORDER — LISINOPRIL 2.5 MG/1
5 TABLET ORAL
Qty: 0 | Refills: 0 | COMMUNITY

## 2018-04-14 RX ORDER — METOPROLOL TARTRATE 50 MG
1 TABLET ORAL
Qty: 0 | Refills: 0 | COMMUNITY

## 2018-04-14 RX ORDER — CARVEDILOL PHOSPHATE 80 MG/1
1 CAPSULE, EXTENDED RELEASE ORAL
Qty: 60 | Refills: 0 | OUTPATIENT
Start: 2018-04-14 | End: 2018-05-13

## 2018-04-14 RX ADMIN — Medication 650 MILLIGRAM(S): at 02:01

## 2018-04-14 RX ADMIN — Medication 650 MILLIGRAM(S): at 17:56

## 2018-04-14 RX ADMIN — Medication 1: at 09:39

## 2018-04-14 RX ADMIN — Medication 650 MILLIGRAM(S): at 01:01

## 2018-04-14 RX ADMIN — PANTOPRAZOLE SODIUM 40 MILLIGRAM(S): 20 TABLET, DELAYED RELEASE ORAL at 05:54

## 2018-04-14 RX ADMIN — Medication 40 MILLIGRAM(S): at 05:54

## 2018-04-14 RX ADMIN — SODIUM CHLORIDE 3 MILLILITER(S): 9 INJECTION INTRAMUSCULAR; INTRAVENOUS; SUBCUTANEOUS at 14:30

## 2018-04-14 RX ADMIN — SODIUM CHLORIDE 3 MILLILITER(S): 9 INJECTION INTRAMUSCULAR; INTRAVENOUS; SUBCUTANEOUS at 05:51

## 2018-04-14 RX ADMIN — Medication 40 MILLIGRAM(S): at 17:56

## 2018-04-14 RX ADMIN — CARVEDILOL PHOSPHATE 6.25 MILLIGRAM(S): 80 CAPSULE, EXTENDED RELEASE ORAL at 17:56

## 2018-04-14 RX ADMIN — Medication 3: at 13:23

## 2018-04-14 RX ADMIN — Medication 250 MILLIGRAM(S): at 00:37

## 2018-04-14 RX ADMIN — FAMOTIDINE 20 MILLIGRAM(S): 10 INJECTION INTRAVENOUS at 17:56

## 2018-04-14 RX ADMIN — FAMOTIDINE 20 MILLIGRAM(S): 10 INJECTION INTRAVENOUS at 05:54

## 2018-04-14 RX ADMIN — Medication 0: at 21:43

## 2018-04-14 RX ADMIN — SODIUM CHLORIDE 3 MILLILITER(S): 9 INJECTION INTRAMUSCULAR; INTRAVENOUS; SUBCUTANEOUS at 22:00

## 2018-04-14 RX ADMIN — Medication 2: at 18:39

## 2018-04-14 NOTE — PROGRESS NOTE ADULT - SUBJECTIVE AND OBJECTIVE BOX
Jermaine Curry MD  Interventional Cardiology  [place] Office : [address] [place] [address] [address]. [address]  Tel:   [place] Office : [address] [facility] [place] [address]  Tel: [phone]  Cell : [phone]    Subjective : Pt lying in bed comfortable, not in distress, denies any chest pain or SOB, some pain at ICD site  	  MEDICATIONS:  carvedilol 6.25 milliGRAM(s) Oral every 12 hours  furosemide   Injectable 40 milliGRAM(s) IV Push every 12 hours        acetaminophen   Tablet. 650 milliGRAM(s) Oral every 6 hours PRN    famotidine    Tablet 20 milliGRAM(s) Oral every 12 hours  pantoprazole    Tablet 40 milliGRAM(s) Oral before breakfast    dextrose 50% Injectable 12.5 Gram(s) IV Push once  dextrose 50% Injectable 25 Gram(s) IV Push once  dextrose 50% Injectable 25 Gram(s) IV Push once  dextrose Gel 1 Dose(s) Oral once PRN  glucagon  Injectable 1 milliGRAM(s) IntraMuscular once PRN  insulin lispro (HumaLOG) corrective regimen sliding scale   SubCutaneous three times a day before meals  insulin lispro (HumaLOG) corrective regimen sliding scale   SubCutaneous at bedtime    chlorhexidine 4% Liquid 1 Application(s) Topical once  sodium chloride 0.9% lock flush 3 milliLiter(s) IV Push every 8 hours      PHYSICAL EXAM:  T(C): 37.1 (04-14-18 @ 05:51), Max: 37.1 (04-14-18 @ 05:51)  HR: 101 (04-14-18 @ 05:51) (86 - 106)  BP: 137/76 (04-14-18 @ 05:51) (118/63 - 157/95)  RR: 19 (04-14-18 @ 05:51) (16 - 19)  SpO2: 98% (04-14-18 @ 05:51) (97% - 98%)  Wt(kg): --  I&O's Summary    13 Apr 2018 07:01  -  14 Apr 2018 07:00  --------------------------------------------------------  IN: 685 mL / OUT: 1200 mL / NET: -515 mL          Appearance: Normal	  HEENT:   Normal oral mucosa, PERRL, EOMI	  Cardiovascular: Normal S1 S2, No JVD, No murmurs, No edema s/p ICD  Respiratory: Lungs clear to auscultation	  Gastrointestinal:  Soft, Non-tender, + BS	  Extremities: Normal range of motion, No clubbing, cyanosis or edema                                    10.4   4.70  )-----------( 99       ( 14 Apr 2018 05:49 )             33.4     04-14    134<L>  |  95<L>  |  19  ----------------------------<  196<H>  4.0   |  25  |  1.16    Ca    8.8      14 Apr 2018 05:49  Mg     1.9     04-14    TPro  7.9  /  Alb  3.5  /  TBili  0.7  /  DBili  x   /  AST  25  /  ALT  14  /  AlkPhos  87  04-12    proBNP:   Lipid Profile:   HgA1c:   TSH:

## 2018-04-14 NOTE — DIETITIAN INITIAL EVALUATION ADULT. - OTHER INFO
Pt referred for nutrition consult.  As per RN, pt is for d/c home today.  Pt visited, but is c/o pain at this time.  Pt w/recent [facility] admission - seen by Romario speaking RDN.  Pt at that time, noted a 5lb wt increase 2/2 fluid retention.  Pt was maintained on Consistent CHO DASH/TLC diet at that time, as with this admission.  Pt provided with diet instruction at that time.  As per RN, pt's food intake currently fair, with approximately 50% food intake at meals.  S/P surgery yesterday

## 2018-04-14 NOTE — PROGRESS NOTE ADULT - ASSESSMENT
EKG - NSR LBBB  Echo - 3/18 - Severely decreased LV s/p AVR tricuspid and mitral annuloplasty     a/p     1) Acute on chronic systolic CHF exacerbation - cont IV lasix s/p BIV ICD doing well add coreg 6.25mg q12    2) DM2 - on insulin     3) DVT prophylasix -  lovenox on hold

## 2018-04-14 NOTE — PROGRESS NOTE ADULT - SUBJECTIVE AND OBJECTIVE BOX
HPI:  84 yo female with PMHx of DM, HLD, HTN, HF (systolic, EF 17%, TTE 3/6/18), s/p Bioprosthetic AVR (2016) presents to ED with complaining of intermittent epigastric pain with burning-like quality, that radiates to the substernal chest, 9/10 severity that began about 1 month ago. Pain worse after she eats and c/o feeling "full" after 2 bites of food.  She also c/o SOB, worsening BOYKIN, productive clear cough, and b/l LE edema which all worsened for past 2 days. Pt's baseline BOYKIN with her ADLs such as walking from on room to another. However for past 2 days she is dyspneic with minima exertion, orthopneic and PND.   As per grandson, the patient also lost her appetite and was not sleeping 2/2 the symptoms.  He states, the pain and SOB seemed to be worse at night, starting at around 11pm and continuing till 6am, at which time she would finally be able to fall asleep for a short time.  Aggravating factors are laying down flat and walking, both causing increased HR and shortness of breath; an aberration from her normal baseline as she was priorly able to get around the house without this level of difficulty.  Grandson reports that the symptoms were mildly alleviated if she sat up straight and avoided laying down flat. Grandson states the patient is compliant with her medications.  He also reports she follows a healthy diet, but sometimes "asks for extra salt" with her meals.   Patient denies CP, Pleuritic pain, hemoptysis, fever/chills, n/v, flank pain, dysuria, hematuria, constipation, diarrhea, hematochezia, or melena. (2018 07:55)    Minor site pain. Otherwise no complaints.     Review Of Systems:  Constitutional: [ ] Fever [ ] Chills [ ] Fatigue [ ] Weight change   HEENT: [ ] Blurred vision [ ] Eye Pain [ ] Headache [ ] Runny nose [ ] Sore Throat   Respiratory: [ ] Cough [ ] Wheezing [ ] Shortness of breath  Cardiovascular: [ ] Chest Pain [ ] Palpitations [ ] BOYKIN [ ] PND [ ] Orthopnea  Gastrointestinal: [ ] Abdominal Pain [ ] Diarrhea [ ] Constipation [ ] Hemorrhoids [ ] Nausea [ ] Vomiting  Genitourinary: [ ] Nocturia [ ] Dysuria [ ] Incontinence  Extremities: [ ] Swelling [ ] Joint Pain  Neurologic: [ ] Focal deficit [ ] Paresthesias [ ] Syncope  Lymphatic: [ ] Swelling [ ] Lymphadenopathy   Skin: [ ] Rash [ ] Ecchymoses [ ] Wounds [ ] Lesions  Psychiatry: [ ] Depression [ ] Suicidal/Homicidal Ideation [ ] Anxiety [ ] Sleep Disturbances  [X] 10 point review of systems is otherwise negative except as mentioned above            [ ]Unable to obtain    Medications:  acetaminophen   Tablet. 650 milliGRAM(s) Oral every 6 hours PRN  carvedilol 6.25 milliGRAM(s) Oral every 12 hours  chlorhexidine 4% Liquid 1 Application(s) Topical once  famotidine    Tablet 20 milliGRAM(s) Oral every 12 hours  furosemide   Injectable 40 milliGRAM(s) IV Push every 12 hours  glucagon  Injectable 1 milliGRAM(s) IntraMuscular once PRN  insulin lispro (HumaLOG) corrective regimen sliding scale   SubCutaneous three times a day before meals  insulin lispro (HumaLOG) corrective regimen sliding scale   SubCutaneous at bedtime  pantoprazole    Tablet 40 milliGRAM(s) Oral before breakfast  sodium chloride 0.9% lock flush 3 milliLiter(s) IV Push every 8 hours    PMH/PSH/FH/SH: [X] Unchanged  Vitals:  T(C): 36.7 (18 @ 14:02), Max: 37.1 (18 @ 05:51)  HR: 104 (18 @ 14:02) (86 - 106)  BP: 122/79 (18 @ 14:02) (118/63 - 157/95)  BP(mean): --  RR: 18 (18 @ 14:02) (16 - 19)  SpO2: 95% (18 @ 14:02) (95% - 98%)  Daily Weight in k.2 (2018 05:51)  I&O's Summary    2018 07:01  -  2018 07:00  --------------------------------------------------------  IN: 685 mL / OUT: 1200 mL / NET: -515 mL    Physical Exam:  Appearance:  Normal, NAD  Eyes: PERRL, EOMI  HENT: Normal oral [place] NC/AT  Cardiovascular: S1, S2, RRR, No m/r/g appreciated, No edema, no elevation in JVP  Procedural Access Site: No hematoma, Non-tender to palpation,  Respiratory: Clear to auscultation bilaterally  Gastrointestinal: Soft, Non-tender, Non-distended, BS+  Skin: No rashes, No ecchymoses, No cyanosis    Labs:                        10.4   4.70  )-----------( 99       ( 2018 05:49 )             33.4     04-14    134<L>  |  95<L>  |  19  ----------------------------<  196<H>  4.0   |  25  |  1.16    Ca    8.8      2018 05:49  Mg     1.9         TPro  7.9  /  Alb  3.5  /  TBili  0.7  /  DBili  x   /  AST  25  /  ALT  14  /  AlkPhos  87  -12    PT/INR - ( 2018 05:28 )   PT: 14.1 SEC;   INR: 1.26         Serum Pro-Brain Natriuretic Peptide: 95381 pg/mL ( @ 04:41)  Serum Pro-Brain Natriuretic Peptide: 09583 pg/mL (04-10 @ 23:00)  Interpretation of Telemetry:  SR, v paced

## 2018-04-14 NOTE — DIETITIAN INITIAL EVALUATION ADULT. - PROBLEM SELECTOR PLAN 1
tele monitor  cardiac enzymes x 2 to R/O ACS.  serial EKGs  2G Sodium 1800 ADA diet with 1500 cc Fluid restriction  Strict I&Os plus Daily weights.  Lasix 40mg IV Q12

## 2018-04-14 NOTE — PROGRESS NOTE ADULT - ASSESSMENT
83 year old Romario speaking female with PMH of NICM, chronic HFrEF, LVEF 40-45%, NYHA class II-III, non-obstructive CAD, HTN, HLD, hypothyroidism, NIDDM s/p bioprosthetic AVR 2016 now presented with acute decompensated HF.  A recent echo done on March 6 showed LVEF 17%.  Considering her severe systolic LV dysfunction despite optimal medical therapy for many years and Class II-III HF, patient meets criteria for ICD for primary prevention of SCD sudden cardiac death.  S/P ICD placement. CXR unremarkable.     -Site healing well  -Pl Keep K >4.0 and Mag> 1.8    -D/C planning per primary team

## 2018-04-15 VITALS
OXYGEN SATURATION: 100 % | HEART RATE: 81 BPM | SYSTOLIC BLOOD PRESSURE: 120 MMHG | TEMPERATURE: 98 F | RESPIRATION RATE: 16 BRPM | DIASTOLIC BLOOD PRESSURE: 55 MMHG

## 2018-04-15 LAB
BUN SERPL-MCNC: 19 MG/DL — SIGNIFICANT CHANGE UP (ref 7–23)
CALCIUM SERPL-MCNC: 8.7 MG/DL — SIGNIFICANT CHANGE UP (ref 8.4–10.5)
CHLORIDE SERPL-SCNC: 95 MMOL/L — LOW (ref 98–107)
CO2 SERPL-SCNC: 25 MMOL/L — SIGNIFICANT CHANGE UP (ref 22–31)
CREAT SERPL-MCNC: 1.26 MG/DL — SIGNIFICANT CHANGE UP (ref 0.5–1.3)
GLUCOSE SERPL-MCNC: 154 MG/DL — HIGH (ref 70–99)
HCT VFR BLD CALC: 32.3 % — LOW (ref 34.5–45)
HGB BLD-MCNC: 9.9 G/DL — LOW (ref 11.5–15.5)
MAGNESIUM SERPL-MCNC: 1.8 MG/DL — SIGNIFICANT CHANGE UP (ref 1.6–2.6)
MCHC RBC-ENTMCNC: 24.5 PG — LOW (ref 27–34)
MCHC RBC-ENTMCNC: 30.7 % — LOW (ref 32–36)
MCV RBC AUTO: 80 FL — SIGNIFICANT CHANGE UP (ref 80–100)
NRBC # FLD: 0 — SIGNIFICANT CHANGE UP
PLATELET # BLD AUTO: 89 K/UL — LOW (ref 150–400)
PMV BLD: 11.1 FL — SIGNIFICANT CHANGE UP (ref 7–13)
POTASSIUM SERPL-MCNC: 3.7 MMOL/L — SIGNIFICANT CHANGE UP (ref 3.5–5.3)
POTASSIUM SERPL-SCNC: 3.7 MMOL/L — SIGNIFICANT CHANGE UP (ref 3.5–5.3)
RBC # BLD: 4.04 M/UL — SIGNIFICANT CHANGE UP (ref 3.8–5.2)
RBC # FLD: 17.9 % — HIGH (ref 10.3–14.5)
SODIUM SERPL-SCNC: 133 MMOL/L — LOW (ref 135–145)
WBC # BLD: 4.65 K/UL — SIGNIFICANT CHANGE UP (ref 3.8–10.5)
WBC # FLD AUTO: 4.65 K/UL — SIGNIFICANT CHANGE UP (ref 3.8–10.5)

## 2018-04-15 RX ORDER — CARVEDILOL PHOSPHATE 80 MG/1
1 CAPSULE, EXTENDED RELEASE ORAL
Qty: 60 | Refills: 0 | OUTPATIENT
Start: 2018-04-15 | End: 2018-05-14

## 2018-04-15 RX ADMIN — PANTOPRAZOLE SODIUM 40 MILLIGRAM(S): 20 TABLET, DELAYED RELEASE ORAL at 05:03

## 2018-04-15 RX ADMIN — SODIUM CHLORIDE 3 MILLILITER(S): 9 INJECTION INTRAMUSCULAR; INTRAVENOUS; SUBCUTANEOUS at 05:02

## 2018-04-15 RX ADMIN — Medication 650 MILLIGRAM(S): at 10:57

## 2018-04-15 RX ADMIN — FAMOTIDINE 20 MILLIGRAM(S): 10 INJECTION INTRAVENOUS at 05:01

## 2018-04-15 RX ADMIN — CHLORHEXIDINE GLUCONATE 1 APPLICATION(S): 213 SOLUTION TOPICAL at 04:55

## 2018-04-15 RX ADMIN — CARVEDILOL PHOSPHATE 6.25 MILLIGRAM(S): 80 CAPSULE, EXTENDED RELEASE ORAL at 05:02

## 2018-04-15 RX ADMIN — Medication 650 MILLIGRAM(S): at 05:02

## 2018-04-15 RX ADMIN — Medication 40 MILLIGRAM(S): at 05:02

## 2018-04-15 RX ADMIN — Medication 650 MILLIGRAM(S): at 11:40

## 2018-04-15 RX ADMIN — Medication 1: at 09:09

## 2018-04-15 RX ADMIN — Medication 650 MILLIGRAM(S): at 04:56

## 2018-04-15 NOTE — PROGRESS NOTE ADULT - SUBJECTIVE AND OBJECTIVE BOX
Jermaine Curry MD  Interventional Cardiology  [place] Office : [address] [place] [address] [address] [address]  Tel:   [place] Office : [address] [facility] [place] [address]  Tel: [phone]  Cell : [phone]    Subjective : Pt lying in bed comfortable, not in distress, some pain at ICD site (mild)  	  MEDICATIONS:  carvedilol 6.25 milliGRAM(s) Oral every 12 hours  furosemide   Injectable 40 milliGRAM(s) IV Push every 12 hours        acetaminophen   Tablet. 650 milliGRAM(s) Oral every 6 hours PRN    famotidine    Tablet 20 milliGRAM(s) Oral every 12 hours  pantoprazole    Tablet 40 milliGRAM(s) Oral before breakfast    dextrose 50% Injectable 12.5 Gram(s) IV Push once  dextrose 50% Injectable 25 Gram(s) IV Push once  dextrose 50% Injectable 25 Gram(s) IV Push once  dextrose Gel 1 Dose(s) Oral once PRN  glucagon  Injectable 1 milliGRAM(s) IntraMuscular once PRN  insulin lispro (HumaLOG) corrective regimen sliding scale   SubCutaneous three times a day before meals  insulin lispro (HumaLOG) corrective regimen sliding scale   SubCutaneous at bedtime    sodium chloride 0.9% lock flush 3 milliLiter(s) IV Push every 8 hours      PHYSICAL EXAM:  T(C): 36.7 (04-15-18 @ 09:03), Max: 36.8 (04-15-18 @ 02:19)  HR: 81 (04-15-18 @ 09:03) (81 - 104)  BP: 117/57 (04-15-18 @ 09:03) (100/62 - 128/69)  RR: 18 (04-15-18 @ 09:03) (18 - 18)  SpO2: 99% (04-15-18 @ 09:03) (95% - 100%)  Wt(kg): --  I&O's Summary    14 Apr 2018 07:01  -  15 Apr 2018 07:00  --------------------------------------------------------  IN: 200 mL / OUT: 0 mL / NET: 200 mL          Appearance: Normal	  HEENT:   Normal oral mucosa, PERRL, EOMI	  Cardiovascular: Normal S1 S2, No JVD, No murmurs, No edema s/p ICD no hematoma  Respiratory: Lungs clear to auscultation	  Gastrointestinal:  Soft, Non-tender, + BS	  Extremities: Normal range of motion, No clubbing, cyanosis or edema                                    9.9    4.65  )-----------( 89       ( 15 Apr 2018 06:48 )             32.3     04-15    133<L>  |  95<L>  |  19  ----------------------------<  154<H>  3.7   |  25  |  1.26    Ca    8.7      15 Apr 2018 06:48  Mg     1.8     04-15      proBNP:   Lipid Profile:   HgA1c:   TSH:

## 2018-04-15 NOTE — PROGRESS NOTE ADULT - ASSESSMENT
EKG - NSR LBBB  Echo - 3/18 - Severely decreased LV s/p AVR tricuspid and mitral annuloplasty     a/p     1) Acute on chronic systolic CHF exacerbation - cont IV lasix s/p BIV ICD doing  increase coregto 12.5mg q12    2) DM2 - on insulin     3) DVT prophylasix -  lovenox on hold    d/c plan

## 2018-04-23 ENCOUNTER — APPOINTMENT (OUTPATIENT)
Dept: ELECTROPHYSIOLOGY | Facility: CLINIC | Age: 83
End: 2018-04-23
Payer: MEDICAID

## 2018-04-23 DIAGNOSIS — Z86.39 PERSONAL HISTORY OF OTHER ENDOCRINE, NUTRITIONAL AND METABOLIC DISEASE: ICD-10-CM

## 2018-04-23 DIAGNOSIS — Z86.79 PERSONAL HISTORY OF OTHER DISEASES OF THE CIRCULATORY SYSTEM: ICD-10-CM

## 2018-04-23 PROCEDURE — 99024 POSTOP FOLLOW-UP VISIT: CPT

## 2018-04-23 RX ORDER — OMEPRAZOLE 40 MG/1
40 CAPSULE, DELAYED RELEASE ORAL
Refills: 0 | Status: ACTIVE | COMMUNITY

## 2018-04-23 RX ORDER — FUROSEMIDE 40 MG/1
40 TABLET ORAL
Refills: 0 | Status: ACTIVE | COMMUNITY

## 2018-04-23 RX ORDER — ATORVASTATIN CALCIUM 10 MG/1
10 TABLET, FILM COATED ORAL
Refills: 0 | Status: ACTIVE | COMMUNITY

## 2018-04-23 RX ORDER — METFORMIN HYDROCHLORIDE 500 MG/1
500 TABLET, FILM COATED ORAL 3 TIMES DAILY
Refills: 0 | Status: ACTIVE | COMMUNITY

## 2018-04-23 RX ORDER — GLIMEPIRIDE 2 MG/1
2 TABLET ORAL
Refills: 0 | Status: ACTIVE | COMMUNITY

## 2018-04-23 RX ORDER — LISINOPRIL 5 MG/1
5 TABLET ORAL
Refills: 0 | Status: ACTIVE | COMMUNITY

## 2018-04-23 RX ORDER — RANITIDINE HYDROCHLORIDE 150 MG/1
150 CAPSULE ORAL
Refills: 0 | Status: ACTIVE | COMMUNITY

## 2018-04-23 RX ORDER — CALCIUM CARBONATE 500(1250)
1250 (500 CA) TABLET ORAL
Refills: 0 | Status: ACTIVE | COMMUNITY

## 2018-04-23 RX ORDER — SITAGLIPTIN 100 MG/1
100 TABLET, FILM COATED ORAL
Refills: 0 | Status: ACTIVE | COMMUNITY

## 2018-04-23 RX ORDER — CARVEDILOL 6.25 MG/1
6.25 TABLET, FILM COATED ORAL
Refills: 0 | Status: ACTIVE | COMMUNITY

## 2018-04-23 RX ORDER — ASPIRIN ENTERIC COATED TABLETS 81 MG 81 MG/1
81 TABLET, DELAYED RELEASE ORAL
Refills: 0 | Status: ACTIVE | COMMUNITY

## 2018-04-25 NOTE — DIETITIAN INITIAL EVALUATION ADULT. - PROBLEM SELECTOR PROBLEM 7
Okay to assist in rescheduling and fit in if she needs to come   Or she can schedule a few weeks after her surgery instead      Need for prophylactic measure

## 2018-07-09 ENCOUNTER — APPOINTMENT (OUTPATIENT)
Dept: ELECTROPHYSIOLOGY | Facility: CLINIC | Age: 83
End: 2018-07-09

## 2018-10-18 ENCOUNTER — APPOINTMENT (OUTPATIENT)
Dept: ELECTROPHYSIOLOGY | Facility: CLINIC | Age: 83
End: 2018-10-18
Payer: MEDICAID

## 2018-10-18 PROCEDURE — 93296 REM INTERROG EVL PM/IDS: CPT

## 2018-10-18 PROCEDURE — 93295 DEV INTERROG REMOTE 1/2/MLT: CPT

## 2019-07-23 PROBLEM — I50.20 UNSPECIFIED SYSTOLIC (CONGESTIVE) HEART FAILURE: Chronic | Status: ACTIVE | Noted: 2018-03-04

## 2021-04-16 NOTE — PATIENT PROFILE ADULT. - FUNCTIONAL SCREEN CURRENT LEVEL: EATING, MLM
ANTICOAGULATION MANAGEMENT     Patient Name:  Latanya Padron  Date:  2021    ASSESSMENT /SUBJECTIVE:    Today's INR result of 2.3 is therapeutic. Goal INR of 2.0-3.0      Warfarin dose taken: Warfarin taken as instructed    Diet: No new diet changes affecting INR    Medication changes/ interactions: No new medications/supplements affecting INR    Previous INR: Therapeutic     S/S of bleeding or thromboembolism: No    New injury or illness: No    Upcoming surgery, procedure or cardioversion: No    Additional findings: None      PLAN:    Telephone call with Latanya regarding INR result and instructed:     Warfarin Dosing Instructions: Continue your current warfarin dose 5 mg Mon and 2.5 mg ROW    Instructed patient to follow up no later than: 3 weeks  Lab visit scheduled    Education provided: None required      pt verbalizes understanding and agrees to warfarin dosing plan.    Instructed to call the Anticoagulation Clinic for any changes, questions or concerns. (#[phone])        Francine Andrew RN      OBJECTIVE:  Recent labs: (last 7 days)     21  0806   INR 2.3*         INR assessment THER    Recheck INR In: 3 WEEKS    INR Location Outside lab      Anticoagulation Summary  As of 2021    INR goal:  2.0-3.0   TTR:  90.2 % (1 y)   INR used for dosin.3 (2021)   Warfarin maintenance plan:  5 mg (2.5 mg x 2) every Mon; 2.5 mg (2.5 mg x 1) all other days   Full warfarin instructions:  5 mg every Mon; 2.5 mg all other days   Weekly warfarin total:  20 mg   No change documented:  Francine Andrew RN   Plan last modified:  Francine Andrew RN (3/29/2021)   Next INR check:  2021   Priority:  Maintenance   Target end date:  Indefinite    Indications    AF (paroxysmal atrial fibrillation) (H) [I48.0]  Atrial fibrillation (H) [I48.91]  Long term current use of anticoagulant therapy [Z79.01]  Atrial fibrillation  unspecified type (H) (Resolved) [I48.91]             Anticoagulation Episode  Summary     INR check location:      Preferred lab:      Send INR reminders to:  [facility] [place]    Comments:  5 mg tabs, likes card, PM dose      Anticoagulation Care Providers     Provider Role Specialty Phone number    Glen Blue MD Referring Family Medicine [phone]              (2) assistive person

## 2023-11-01 NOTE — DISCHARGE NOTE ADULT - PHYSICIAN SECTION COMPLETE
Patient presented with bacteremia with Proteus and Bacteroides   Secondary to right foot wound with gangrene  Completed course of antibiotics per ID, with 7d of ceftriaxone and Flagyl Yes

## 2024-03-28 NOTE — PATIENT PROFILE ADULT. - FALL HARM RISK CONCLUSION
Department of Anesthesiology  Preprocedure Note       Name:  Jolanta Montenegro   Age:  78 y.o.  :  1945                                          MRN:  [id number]         Date:  3/28/2024      Surgeon: Surgeon(s):  Catrachito Otto MD    Procedure: Procedure(s):  ESOPHAGOGASTRODUODENOSCOPY BIOPSY  COLORECTAL CANCER SCREENING, NOT HIGH RISK    Medications prior to admission:   Prior to Admission medications    Medication Sig Start Date End Date Taking? Authorizing Provider   empagliflozin (JARDIANCE) 10 MG tablet Take 1 tablet by mouth daily 3/20/24   Alyx Perez MD   ferrous sulfate 324 (65 Fe) MG EC tablet Take 1 tablet by mouth 2 times daily (with meals) 3/18/24   Nuris Gonsales PA   pantoprazole (PROTONIX) 40 MG tablet Take 1 tablet by mouth 2 times daily (before meals) 3/18/24   Nuris Gonsales PA   sucralfate (CARAFATE) 1 GM tablet Take 1 tablet by mouth 4 times daily (before meals and nightly) 3/18/24   Nuris Gonsales PA   atorvastatin (LIPITOR) 40 MG tablet Take 1 tablet by mouth daily    Provider, MD Bassam   spironolactone (ALDACTONE) 25 MG tablet TAKE 1 TABLET BY MOUTH DAILY 3/4/24   Alyx Perez MD   citalopram (CELEXA) 10 MG tablet TAKE 1 TABLET BY MOUTH DAILY 3/4/24   Alyx Perez MD   levocetirizine (XYZAL) 5 MG tablet TAKE 1 TABLET BY MOUTH EVERY NIGHT 2/15/24   Alyx Perez MD   glipiZIDE (GLUCOTROL XL) 10 MG extended release tablet TAKE 1 TABLET BY MOUTH DAILY 24   Alyx Perez MD   montelukast (SINGULAIR) 10 MG tablet TAKE 1 TABLET BY MOUTH EVERY NIGHT 24   Alyx Perez MD   Semaglutide,0.25 or 0.5MG/DOS, (OZEMPIC, 0.25 OR 0.5 MG/DOSE,) 2 MG/3ML SOPN Inject 0.25 mg into the skin once a week 23   Alyx Perez MD   blood glucose monitor strips Test TID DX E11.9 23   Alyx Perez MD   docusate (COLACE, DULCOLAX) 100 MG CAPS Take 100 mg by mouth daily 10/19/23   Alyx Perez MD   furosemide (LASIX) 20 MG tablet Take 1 tablet by mouth daily 10/19/23   
Fall with Harm Risk

## 2024-12-11 NOTE — ED ADULT NURSE NOTE - IN THE PAST YEAR, HOW OFTEN HAVE YOU USED TOBACCO PRODUCTS?
Anesthesia Start and Stop Event Times       Date Time Event    12/11/2024 0933 Ready for Procedure     1106 Anesthesia Start     1227 Anesthesia Stop          Responsible Staff  12/11/24      Name Role Begin End    Mani Contreras D.O. Anesth 1106 1227          Overtime Reason:  no overtime (within assigned shift)    Comments:                                                          
Never